# Patient Record
Sex: FEMALE | Race: WHITE | Employment: OTHER | URBAN - METROPOLITAN AREA
[De-identification: names, ages, dates, MRNs, and addresses within clinical notes are randomized per-mention and may not be internally consistent; named-entity substitution may affect disease eponyms.]

---

## 2017-02-01 ENCOUNTER — OFFICE VISIT (OUTPATIENT)
Dept: FAMILY MEDICINE CLINIC | Age: 72
End: 2017-02-01

## 2017-02-01 VITALS
DIASTOLIC BLOOD PRESSURE: 74 MMHG | SYSTOLIC BLOOD PRESSURE: 136 MMHG | WEIGHT: 193.4 LBS | RESPIRATION RATE: 16 BRPM | BODY MASS INDEX: 34.27 KG/M2 | HEIGHT: 63 IN | HEART RATE: 72 BPM

## 2017-02-01 DIAGNOSIS — H11.31 SUBCONJUNCTIVAL HEMORRHAGE OF RIGHT EYE: ICD-10-CM

## 2017-02-01 DIAGNOSIS — I16.0 HYPERTENSIVE URGENCY: Primary | ICD-10-CM

## 2017-02-01 DIAGNOSIS — D75.1 POLYCYTHEMIA: ICD-10-CM

## 2017-02-01 DIAGNOSIS — G47.00 INSOMNIA, UNSPECIFIED TYPE: ICD-10-CM

## 2017-02-01 DIAGNOSIS — E78.5 HYPERLIPIDEMIA, UNSPECIFIED HYPERLIPIDEMIA TYPE: ICD-10-CM

## 2017-02-01 PROCEDURE — 1090F PRES/ABSN URINE INCON ASSESS: CPT | Performed by: FAMILY MEDICINE

## 2017-02-01 PROCEDURE — 4040F PNEUMOC VAC/ADMIN/RCVD: CPT | Performed by: FAMILY MEDICINE

## 2017-02-01 PROCEDURE — G8427 DOCREV CUR MEDS BY ELIG CLIN: HCPCS | Performed by: FAMILY MEDICINE

## 2017-02-01 PROCEDURE — 1123F ACP DISCUSS/DSCN MKR DOCD: CPT | Performed by: FAMILY MEDICINE

## 2017-02-01 PROCEDURE — G8400 PT W/DXA NO RESULTS DOC: HCPCS | Performed by: FAMILY MEDICINE

## 2017-02-01 PROCEDURE — 99214 OFFICE O/P EST MOD 30 MIN: CPT | Performed by: FAMILY MEDICINE

## 2017-02-01 PROCEDURE — 3017F COLORECTAL CA SCREEN DOC REV: CPT | Performed by: FAMILY MEDICINE

## 2017-02-01 PROCEDURE — G8419 CALC BMI OUT NRM PARAM NOF/U: HCPCS | Performed by: FAMILY MEDICINE

## 2017-02-01 PROCEDURE — 4004F PT TOBACCO SCREEN RCVD TLK: CPT | Performed by: FAMILY MEDICINE

## 2017-02-01 PROCEDURE — 3014F SCREEN MAMMO DOC REV: CPT | Performed by: FAMILY MEDICINE

## 2017-02-01 PROCEDURE — G8484 FLU IMMUNIZE NO ADMIN: HCPCS | Performed by: FAMILY MEDICINE

## 2017-02-01 RX ORDER — ZOLPIDEM TARTRATE 10 MG/1
TABLET ORAL
Qty: 30 TABLET | Refills: 5 | Status: SHIPPED | OUTPATIENT
Start: 2017-02-01 | End: 2017-08-29 | Stop reason: SDUPTHER

## 2017-02-01 RX ORDER — TRAMADOL HYDROCHLORIDE 50 MG/1
50 TABLET ORAL EVERY 6 HOURS PRN
COMMUNITY
End: 2017-10-18 | Stop reason: SDUPTHER

## 2017-02-01 RX ORDER — HYDROCHLOROTHIAZIDE 12.5 MG/1
12.5 CAPSULE, GELATIN COATED ORAL DAILY
COMMUNITY
End: 2018-01-05

## 2017-02-01 RX ORDER — LABETALOL 200 MG/1
200 TABLET, FILM COATED ORAL 2 TIMES DAILY
COMMUNITY
End: 2018-01-05

## 2017-02-01 ASSESSMENT — ENCOUNTER SYMPTOMS
GASTROINTESTINAL NEGATIVE: 1
RESPIRATORY NEGATIVE: 1

## 2017-02-14 ENCOUNTER — OFFICE VISIT (OUTPATIENT)
Dept: ONCOLOGY | Age: 72
End: 2017-02-14

## 2017-02-14 VITALS
RESPIRATION RATE: 18 BRPM | OXYGEN SATURATION: 96 % | HEART RATE: 76 BPM | HEIGHT: 63 IN | SYSTOLIC BLOOD PRESSURE: 178 MMHG | DIASTOLIC BLOOD PRESSURE: 87 MMHG | TEMPERATURE: 97 F | WEIGHT: 195 LBS | BODY MASS INDEX: 34.55 KG/M2

## 2017-02-14 DIAGNOSIS — D75.1 POLYCYTHEMIA: Primary | ICD-10-CM

## 2017-02-14 DIAGNOSIS — R71.8 OTHER ABNORMALITY OF RED BLOOD CELLS: ICD-10-CM

## 2017-02-14 PROCEDURE — G8417 CALC BMI ABV UP PARAM F/U: HCPCS | Performed by: INTERNAL MEDICINE

## 2017-02-14 PROCEDURE — 99204 OFFICE O/P NEW MOD 45 MIN: CPT | Performed by: INTERNAL MEDICINE

## 2017-02-14 PROCEDURE — 1090F PRES/ABSN URINE INCON ASSESS: CPT | Performed by: INTERNAL MEDICINE

## 2017-02-14 PROCEDURE — 1123F ACP DISCUSS/DSCN MKR DOCD: CPT | Performed by: INTERNAL MEDICINE

## 2017-02-14 PROCEDURE — G8484 FLU IMMUNIZE NO ADMIN: HCPCS | Performed by: INTERNAL MEDICINE

## 2017-02-14 PROCEDURE — 3014F SCREEN MAMMO DOC REV: CPT | Performed by: INTERNAL MEDICINE

## 2017-02-14 PROCEDURE — 3017F COLORECTAL CA SCREEN DOC REV: CPT | Performed by: INTERNAL MEDICINE

## 2017-02-14 PROCEDURE — 4004F PT TOBACCO SCREEN RCVD TLK: CPT | Performed by: INTERNAL MEDICINE

## 2017-02-14 PROCEDURE — G8400 PT W/DXA NO RESULTS DOC: HCPCS | Performed by: INTERNAL MEDICINE

## 2017-02-14 PROCEDURE — G8427 DOCREV CUR MEDS BY ELIG CLIN: HCPCS | Performed by: INTERNAL MEDICINE

## 2017-02-14 PROCEDURE — 4040F PNEUMOC VAC/ADMIN/RCVD: CPT | Performed by: INTERNAL MEDICINE

## 2017-02-14 RX ORDER — ATORVASTATIN CALCIUM 40 MG/1
40 TABLET, FILM COATED ORAL DAILY
COMMUNITY
Start: 2017-01-25 | End: 2018-01-05 | Stop reason: SDUPTHER

## 2017-02-15 PROBLEM — D75.1 POLYCYTHEMIA: Status: ACTIVE | Noted: 2017-02-15

## 2017-02-21 ENCOUNTER — OFFICE VISIT (OUTPATIENT)
Dept: ONCOLOGY | Age: 72
End: 2017-02-21

## 2017-02-21 VITALS
HEART RATE: 73 BPM | DIASTOLIC BLOOD PRESSURE: 88 MMHG | OXYGEN SATURATION: 99 % | WEIGHT: 195.4 LBS | HEIGHT: 63 IN | TEMPERATURE: 97.1 F | SYSTOLIC BLOOD PRESSURE: 166 MMHG | BODY MASS INDEX: 34.62 KG/M2 | RESPIRATION RATE: 18 BRPM

## 2017-02-21 DIAGNOSIS — D75.1 POLYCYTHEMIA: Primary | ICD-10-CM

## 2017-02-21 PROCEDURE — G8427 DOCREV CUR MEDS BY ELIG CLIN: HCPCS | Performed by: INTERNAL MEDICINE

## 2017-02-21 PROCEDURE — G8417 CALC BMI ABV UP PARAM F/U: HCPCS | Performed by: INTERNAL MEDICINE

## 2017-02-21 PROCEDURE — 99213 OFFICE O/P EST LOW 20 MIN: CPT | Performed by: INTERNAL MEDICINE

## 2017-02-21 PROCEDURE — 1123F ACP DISCUSS/DSCN MKR DOCD: CPT | Performed by: INTERNAL MEDICINE

## 2017-02-21 PROCEDURE — 1090F PRES/ABSN URINE INCON ASSESS: CPT | Performed by: INTERNAL MEDICINE

## 2017-02-21 PROCEDURE — 3014F SCREEN MAMMO DOC REV: CPT | Performed by: INTERNAL MEDICINE

## 2017-02-21 PROCEDURE — G8484 FLU IMMUNIZE NO ADMIN: HCPCS | Performed by: INTERNAL MEDICINE

## 2017-02-21 PROCEDURE — 3017F COLORECTAL CA SCREEN DOC REV: CPT | Performed by: INTERNAL MEDICINE

## 2017-02-21 PROCEDURE — 4040F PNEUMOC VAC/ADMIN/RCVD: CPT | Performed by: INTERNAL MEDICINE

## 2017-02-21 PROCEDURE — G8400 PT W/DXA NO RESULTS DOC: HCPCS | Performed by: INTERNAL MEDICINE

## 2017-02-21 PROCEDURE — 4004F PT TOBACCO SCREEN RCVD TLK: CPT | Performed by: INTERNAL MEDICINE

## 2017-06-19 ENCOUNTER — OFFICE VISIT (OUTPATIENT)
Dept: FAMILY MEDICINE CLINIC | Age: 72
End: 2017-06-19

## 2017-06-19 VITALS
HEIGHT: 64 IN | BODY MASS INDEX: 32.61 KG/M2 | RESPIRATION RATE: 14 BRPM | WEIGHT: 191 LBS | HEART RATE: 82 BPM | SYSTOLIC BLOOD PRESSURE: 136 MMHG | OXYGEN SATURATION: 98 % | DIASTOLIC BLOOD PRESSURE: 84 MMHG

## 2017-06-19 DIAGNOSIS — I10 ESSENTIAL HYPERTENSION: Primary | ICD-10-CM

## 2017-06-19 DIAGNOSIS — F41.0 PANIC DISORDER: ICD-10-CM

## 2017-06-19 DIAGNOSIS — B00.2 ORAL HERPES SIMPLEX INFECTION: ICD-10-CM

## 2017-06-19 DIAGNOSIS — E78.5 HYPERLIPIDEMIA, UNSPECIFIED HYPERLIPIDEMIA TYPE: ICD-10-CM

## 2017-06-19 DIAGNOSIS — M19.071 PRIMARY OSTEOARTHRITIS OF BOTH FEET: ICD-10-CM

## 2017-06-19 DIAGNOSIS — M19.072 PRIMARY OSTEOARTHRITIS OF BOTH FEET: ICD-10-CM

## 2017-06-19 DIAGNOSIS — Z51.81 MEDICATION MONITORING ENCOUNTER: ICD-10-CM

## 2017-06-19 PROCEDURE — 1123F ACP DISCUSS/DSCN MKR DOCD: CPT | Performed by: FAMILY MEDICINE

## 2017-06-19 PROCEDURE — G8427 DOCREV CUR MEDS BY ELIG CLIN: HCPCS | Performed by: FAMILY MEDICINE

## 2017-06-19 PROCEDURE — 3017F COLORECTAL CA SCREEN DOC REV: CPT | Performed by: FAMILY MEDICINE

## 2017-06-19 PROCEDURE — G8400 PT W/DXA NO RESULTS DOC: HCPCS | Performed by: FAMILY MEDICINE

## 2017-06-19 PROCEDURE — 4040F PNEUMOC VAC/ADMIN/RCVD: CPT | Performed by: FAMILY MEDICINE

## 2017-06-19 PROCEDURE — 4004F PT TOBACCO SCREEN RCVD TLK: CPT | Performed by: FAMILY MEDICINE

## 2017-06-19 PROCEDURE — 3014F SCREEN MAMMO DOC REV: CPT | Performed by: FAMILY MEDICINE

## 2017-06-19 PROCEDURE — 1090F PRES/ABSN URINE INCON ASSESS: CPT | Performed by: FAMILY MEDICINE

## 2017-06-19 PROCEDURE — G8417 CALC BMI ABV UP PARAM F/U: HCPCS | Performed by: FAMILY MEDICINE

## 2017-06-19 PROCEDURE — 99214 OFFICE O/P EST MOD 30 MIN: CPT | Performed by: FAMILY MEDICINE

## 2017-06-19 RX ORDER — FAMCICLOVIR 500 MG/1
TABLET, FILM COATED ORAL
Qty: 24 TABLET | Refills: 1 | Status: SHIPPED | OUTPATIENT
Start: 2017-06-19 | End: 2017-08-29 | Stop reason: SDUPTHER

## 2017-06-19 RX ORDER — FAMCICLOVIR 500 MG/1
TABLET, FILM COATED ORAL
COMMUNITY
End: 2017-06-19 | Stop reason: SDUPTHER

## 2017-06-19 ASSESSMENT — ENCOUNTER SYMPTOMS
RESPIRATORY NEGATIVE: 1
COUGH: 0
GASTROINTESTINAL NEGATIVE: 1

## 2017-06-23 LAB
ANION GAP SERPL CALCULATED.3IONS-SCNC: 15 MMOL/L
BUN BLDV-MCNC: 9 MG/DL (ref 9–24)
CALCIUM SERPL-MCNC: 9.7 MG/DL (ref 8.7–10.8)
CHLORIDE BLD-SCNC: 106 MMOL/L (ref 95–111)
CHOLESTEROL/HDL RATIO: 3.9
CHOLESTEROL: 163 MG/DL
CO2: 27 MMOL/L (ref 21–32)
CREAT SERPL-MCNC: 0.8 MG/DL (ref 0.5–1.3)
EGFR AFRICAN AMERICAN: 86
EGFR IF NONAFRICAN AMERICAN: 71
GLUCOSE: 104 MG/DL (ref 70–100)
HDLC SERPL-MCNC: 42 MG/DL (ref 40–60)
LDL CHOLESTEROL CALCULATED: 82 MG/DL
LDL/HDL RATIO: 2
POTASSIUM SERPL-SCNC: 4.6 MMOL/L (ref 3.5–5.4)
SODIUM BLD-SCNC: 143 MMOL/L (ref 134–147)
TRIGL SERPL-MCNC: 196 MG/DL
VLDLC SERPL CALC-MCNC: 39 MG/DL

## 2017-06-29 RX ORDER — ALPRAZOLAM 1 MG/1
TABLET ORAL
Qty: 30 TABLET | Refills: 1 | Status: SHIPPED | OUTPATIENT
Start: 2017-06-29 | End: 2017-08-29 | Stop reason: SDUPTHER

## 2017-08-29 DIAGNOSIS — B00.2 ORAL HERPES SIMPLEX INFECTION: ICD-10-CM

## 2017-08-29 DIAGNOSIS — G47.00 INSOMNIA, UNSPECIFIED TYPE: ICD-10-CM

## 2017-08-29 RX ORDER — ALPRAZOLAM 1 MG/1
1 TABLET ORAL NIGHTLY PRN
Qty: 90 TABLET | Refills: 1 | Status: SHIPPED | OUTPATIENT
Start: 2017-08-29 | End: 2018-02-20 | Stop reason: SDUPTHER

## 2017-08-29 RX ORDER — NITROGLYCERIN 0.4 MG/1
0.4 TABLET SUBLINGUAL EVERY 5 MIN PRN
Qty: 25 TABLET | Refills: 1 | Status: SHIPPED | OUTPATIENT
Start: 2017-08-29 | End: 2019-02-25 | Stop reason: SDUPTHER

## 2017-08-29 RX ORDER — ZOLPIDEM TARTRATE 10 MG/1
TABLET ORAL
Qty: 90 TABLET | Refills: 2 | Status: SHIPPED | OUTPATIENT
Start: 2017-08-29 | End: 2018-04-09

## 2017-08-29 RX ORDER — FAMCICLOVIR 500 MG/1
TABLET, FILM COATED ORAL
Qty: 24 TABLET | Refills: 1 | Status: SHIPPED | OUTPATIENT
Start: 2017-08-29 | End: 2018-01-05

## 2017-10-18 ENCOUNTER — TELEPHONE (OUTPATIENT)
Dept: FAMILY MEDICINE CLINIC | Age: 72
End: 2017-10-18

## 2017-10-18 DIAGNOSIS — M79.671 FOOT PAIN, RIGHT: Primary | ICD-10-CM

## 2017-10-18 RX ORDER — TRAMADOL HYDROCHLORIDE 50 MG/1
50 TABLET ORAL EVERY 6 HOURS PRN
Qty: 60 TABLET | Refills: 1 | Status: SHIPPED | OUTPATIENT
Start: 2017-10-18 | End: 2018-08-11 | Stop reason: SDUPTHER

## 2017-10-18 NOTE — TELEPHONE ENCOUNTER
Patient asking for refill of Tramadol to help with her foot pain to use prn. Asking to send it Arnel Rausch. If no call will check with pharmacy after 4. Please advise.

## 2017-12-29 ENCOUNTER — TELEPHONE (OUTPATIENT)
Dept: FAMILY MEDICINE CLINIC | Age: 72
End: 2017-12-29

## 2018-01-02 ENCOUNTER — TELEPHONE (OUTPATIENT)
Dept: FAMILY MEDICINE CLINIC | Age: 73
End: 2018-01-02

## 2018-01-05 ENCOUNTER — OFFICE VISIT (OUTPATIENT)
Dept: FAMILY MEDICINE CLINIC | Age: 73
End: 2018-01-05
Payer: MEDICARE

## 2018-01-05 VITALS
TEMPERATURE: 97.5 F | HEART RATE: 80 BPM | BODY MASS INDEX: 26.49 KG/M2 | WEIGHT: 159 LBS | RESPIRATION RATE: 14 BRPM | HEIGHT: 65 IN | SYSTOLIC BLOOD PRESSURE: 116 MMHG | OXYGEN SATURATION: 98 % | DIASTOLIC BLOOD PRESSURE: 70 MMHG

## 2018-01-05 DIAGNOSIS — I25.10 CORONARY ARTERY DISEASE INVOLVING NATIVE HEART WITHOUT ANGINA PECTORIS, UNSPECIFIED VESSEL OR LESION TYPE: ICD-10-CM

## 2018-01-05 DIAGNOSIS — I63.02 THROMBOTIC STROKE INVOLVING BASILAR ARTERY (HCC): Primary | ICD-10-CM

## 2018-01-05 DIAGNOSIS — E78.5 HYPERLIPIDEMIA, UNSPECIFIED HYPERLIPIDEMIA TYPE: ICD-10-CM

## 2018-01-05 DIAGNOSIS — G47.00 INSOMNIA, UNSPECIFIED TYPE: ICD-10-CM

## 2018-01-05 DIAGNOSIS — Z95.1 S/P CABG X 4: ICD-10-CM

## 2018-01-05 DIAGNOSIS — D75.1 POLYCYTHEMIA: ICD-10-CM

## 2018-01-05 PROCEDURE — 3014F SCREEN MAMMO DOC REV: CPT | Performed by: NURSE PRACTITIONER

## 2018-01-05 PROCEDURE — 1036F TOBACCO NON-USER: CPT | Performed by: NURSE PRACTITIONER

## 2018-01-05 PROCEDURE — G8400 PT W/DXA NO RESULTS DOC: HCPCS | Performed by: NURSE PRACTITIONER

## 2018-01-05 PROCEDURE — 99214 OFFICE O/P EST MOD 30 MIN: CPT | Performed by: NURSE PRACTITIONER

## 2018-01-05 PROCEDURE — G8419 CALC BMI OUT NRM PARAM NOF/U: HCPCS | Performed by: NURSE PRACTITIONER

## 2018-01-05 PROCEDURE — 1123F ACP DISCUSS/DSCN MKR DOCD: CPT | Performed by: NURSE PRACTITIONER

## 2018-01-05 PROCEDURE — 1090F PRES/ABSN URINE INCON ASSESS: CPT | Performed by: NURSE PRACTITIONER

## 2018-01-05 PROCEDURE — G8484 FLU IMMUNIZE NO ADMIN: HCPCS | Performed by: NURSE PRACTITIONER

## 2018-01-05 PROCEDURE — G8427 DOCREV CUR MEDS BY ELIG CLIN: HCPCS | Performed by: NURSE PRACTITIONER

## 2018-01-05 PROCEDURE — G8598 ASA/ANTIPLAT THER USED: HCPCS | Performed by: NURSE PRACTITIONER

## 2018-01-05 PROCEDURE — 3017F COLORECTAL CA SCREEN DOC REV: CPT | Performed by: NURSE PRACTITIONER

## 2018-01-05 PROCEDURE — 4040F PNEUMOC VAC/ADMIN/RCVD: CPT | Performed by: NURSE PRACTITIONER

## 2018-01-05 RX ORDER — MULTIPLE VITAMINS W/ MINERALS TAB 9MG-400MCG
1 TAB ORAL DAILY
COMMUNITY

## 2018-01-05 RX ORDER — LISINOPRIL 10 MG/1
10 TABLET ORAL DAILY
Qty: 90 TABLET | Refills: 3 | Status: SHIPPED | OUTPATIENT
Start: 2018-01-05 | End: 2019-06-28 | Stop reason: SDUPTHER

## 2018-01-05 RX ORDER — FUROSEMIDE 20 MG/1
20 TABLET ORAL DAILY
COMMUNITY
End: 2018-01-05 | Stop reason: SDUPTHER

## 2018-01-05 RX ORDER — DRONABINOL 2.5 MG/1
2.5 CAPSULE ORAL
COMMUNITY
End: 2018-04-09

## 2018-01-05 RX ORDER — LISINOPRIL 10 MG/1
10 TABLET ORAL DAILY
COMMUNITY
End: 2018-01-05

## 2018-01-05 RX ORDER — FUROSEMIDE 20 MG/1
20 TABLET ORAL DAILY
Qty: 90 TABLET | Refills: 3 | Status: SHIPPED | OUTPATIENT
Start: 2018-01-05 | End: 2019-11-11

## 2018-01-05 RX ORDER — ACYCLOVIR 50 MG/G
OINTMENT TOPICAL PRN
COMMUNITY
End: 2019-11-11 | Stop reason: SDUPTHER

## 2018-01-05 RX ORDER — ATORVASTATIN CALCIUM 40 MG/1
40 TABLET, FILM COATED ORAL DAILY
Qty: 90 TABLET | Refills: 3 | Status: SHIPPED | OUTPATIENT
Start: 2018-01-05 | End: 2018-12-16 | Stop reason: SDUPTHER

## 2018-01-05 RX ORDER — LISINOPRIL 10 MG/1
10 TABLET ORAL DAILY
COMMUNITY
End: 2018-01-05 | Stop reason: SDUPTHER

## 2018-01-05 RX ORDER — CARVEDILOL 6.25 MG/1
6.25 TABLET ORAL 2 TIMES DAILY
COMMUNITY
End: 2018-01-05

## 2018-01-05 RX ORDER — OXYCODONE HYDROCHLORIDE AND ACETAMINOPHEN 5; 325 MG/1; MG/1
2 TABLET ORAL EVERY 4 HOURS PRN
COMMUNITY
End: 2018-05-21 | Stop reason: SDUPTHER

## 2018-01-05 ASSESSMENT — ENCOUNTER SYMPTOMS
COUGH: 0
CHEST TIGHTNESS: 0
SHORTNESS OF BREATH: 0
NAUSEA: 0
ABDOMINAL PAIN: 0

## 2018-01-05 ASSESSMENT — PATIENT HEALTH QUESTIONNAIRE - PHQ9
SUM OF ALL RESPONSES TO PHQ QUESTIONS 1-9: 0
1. LITTLE INTEREST OR PLEASURE IN DOING THINGS: 0
2. FEELING DOWN, DEPRESSED OR HOPELESS: 0
SUM OF ALL RESPONSES TO PHQ9 QUESTIONS 1 & 2: 0

## 2018-01-05 NOTE — PROGRESS NOTES
Subjective:      Patient ID: Frankey Croon is a 67 y.o. female. HPI: Hospital Follow Up    Chief Complaint   Patient presents with    Follow-Up from [address] [place] [address]/[facility] 12/29/18  martha s/p CVA  transitional care  needs colonoscopy and mammo    Medication Refill     pt request 90-day refills        End of November she had CABG x 4 with Dr. Raheem Lynch. Was doing well and discharged home. Hospital Course:   Ms. Aleks Dumont Is a 66-year-old female patient who was found unresponsive by her grandchildren at 3:00 p.m. On December 7th. EMS was contacted and the patient was found to be pulseless. She recently had a CABG therefore she was given supportive breathing. She was taken to [facility] where her mentation improved and her NIH stroke scale score was initially a 3. She had a noncontrast head CT which was negative for acute process. She was outside of the window for IV tPA. 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 The patient declined requiring intubation. 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 She was admitted to the neuro ICU for further workup and evaluation. While in the neuro ICU the patient underwent a stroke workup including a CT of the head which revealed an area of ischemia in the left occipital region. She was unable to have an MRI due to the recent CABG. She had a transthoracic echocardiogram showing an ejection fraction of 35-40%, moderately dilated left atrium, no shunt, global hypokinesis. Lipid panel revealed a total cholesterol 102 and LDL 52 and hemoglobin A1c was satisfactory of 5.1. Cardiology was consulted due to patient's recent CABG.  They did a FELISHA which showed ejection fraction of 35-40%, moderate mitral regurgitation, no left monitoring encounter    Depression    Contusion of knee, right    Sleep walking    Foot pain, right    Postmenopausal state    Hair thinning    Oral herpes simplex infection    Polycythemia         BP Readings from Last 3 Encounters:   01/05/18 116/70   06/19/17 136/84   02/21/17 (!) 166/88         No results found for: LABA1C  No results found for: EAG    No components found for: CHLPL  Lab Results   Component Value Date    TRIG 196 (H) 06/22/2017     Lab Results   Component Value Date    HDL 42 06/22/2017     Lab Results   Component Value Date    LDLCALC 82 06/22/2017     Lab Results   Component Value Date    LABVLDL 39 (H) 06/22/2017         Chemistry        Component Value Date/Time     12/07/2017 1643    K 3.9 12/07/2017 1643     12/07/2017 1643    CO2 23 12/07/2017 1643    BUN 10 12/07/2017 1643    CREATININE 0.93 12/07/2017 1643        Component Value Date/Time    CALCIUM 9.3 12/07/2017 1643    ALKPHOS 65 11/21/2017 0905    AST 30 11/21/2017 0905    ALT 24 11/21/2017 0905    BILITOT 1.3 (H) 11/21/2017 0905          No results found for: TSH, V6URTKL, P8XEOYB, THYROIDAB    Lab Results   Component Value Date    WBC 13.0 (H) 12/07/2017    HGB 12.3 12/07/2017    HCT 36.4 12/07/2017    MCV 88.3 12/07/2017     12/07/2017       Health Maintenance   Topic Date Due    Hepatitis C screen  1945    Colon cancer screen colonoscopy  08/12/1995    DTaP/Tdap/Td vaccine (1 - Tdap) 11/05/2010    Flu vaccine (1) 09/01/2017    Breast cancer screen  12/04/2017    Potassium monitoring  12/07/2018    Creatinine monitoring  12/07/2018    Lipid screen  06/22/2022    Zostavax vaccine  Completed    DEXA (modify frequency per FRAX score)  Completed    Pneumococcal low/med risk  Completed       Immunization History   Administered Date(s) Administered    Hib, unspecified foumulation 11/23/2010    Influenza Whole 11/11/2010    MMR 11/23/2010    Pneumococcal 13-valent Conjugate (Andriette Query)

## 2018-01-30 ENCOUNTER — TELEPHONE (OUTPATIENT)
Dept: FAMILY MEDICINE CLINIC | Age: 73
End: 2018-01-30

## 2018-02-20 RX ORDER — ALPRAZOLAM 1 MG/1
1 TABLET ORAL NIGHTLY PRN
Qty: 90 TABLET | Refills: 1 | Status: SHIPPED | OUTPATIENT
Start: 2018-02-20 | End: 2018-12-13 | Stop reason: SDUPTHER

## 2018-04-09 ENCOUNTER — OFFICE VISIT (OUTPATIENT)
Dept: FAMILY MEDICINE CLINIC | Age: 73
End: 2018-04-09
Payer: MEDICARE

## 2018-04-09 VITALS
OXYGEN SATURATION: 98 % | HEART RATE: 96 BPM | DIASTOLIC BLOOD PRESSURE: 72 MMHG | SYSTOLIC BLOOD PRESSURE: 112 MMHG | HEIGHT: 64 IN | WEIGHT: 155 LBS | BODY MASS INDEX: 26.46 KG/M2 | RESPIRATION RATE: 14 BRPM

## 2018-04-09 DIAGNOSIS — F32.1 MODERATE MAJOR DEPRESSION, SINGLE EPISODE (HCC): ICD-10-CM

## 2018-04-09 DIAGNOSIS — I63.02 THROMBOTIC STROKE INVOLVING BASILAR ARTERY (HCC): Primary | ICD-10-CM

## 2018-04-09 DIAGNOSIS — Z51.81 MEDICATION MONITORING ENCOUNTER: ICD-10-CM

## 2018-04-09 DIAGNOSIS — Z95.1 S/P CABG X 4: ICD-10-CM

## 2018-04-09 DIAGNOSIS — Z91.81 AT HIGH RISK FOR FALLS: ICD-10-CM

## 2018-04-09 DIAGNOSIS — I10 ESSENTIAL HYPERTENSION: ICD-10-CM

## 2018-04-09 DIAGNOSIS — F32.9 REACTIVE DEPRESSION: ICD-10-CM

## 2018-04-09 DIAGNOSIS — I25.10 CORONARY ARTERY DISEASE INVOLVING NATIVE HEART WITHOUT ANGINA PECTORIS, UNSPECIFIED VESSEL OR LESION TYPE: ICD-10-CM

## 2018-04-09 PROCEDURE — 3017F COLORECTAL CA SCREEN DOC REV: CPT | Performed by: FAMILY MEDICINE

## 2018-04-09 PROCEDURE — G8427 DOCREV CUR MEDS BY ELIG CLIN: HCPCS | Performed by: FAMILY MEDICINE

## 2018-04-09 PROCEDURE — 4040F PNEUMOC VAC/ADMIN/RCVD: CPT | Performed by: FAMILY MEDICINE

## 2018-04-09 PROCEDURE — 99214 OFFICE O/P EST MOD 30 MIN: CPT | Performed by: FAMILY MEDICINE

## 2018-04-09 PROCEDURE — 3014F SCREEN MAMMO DOC REV: CPT | Performed by: FAMILY MEDICINE

## 2018-04-09 PROCEDURE — 1036F TOBACCO NON-USER: CPT | Performed by: FAMILY MEDICINE

## 2018-04-09 PROCEDURE — G8419 CALC BMI OUT NRM PARAM NOF/U: HCPCS | Performed by: FAMILY MEDICINE

## 2018-04-09 PROCEDURE — 1123F ACP DISCUSS/DSCN MKR DOCD: CPT | Performed by: FAMILY MEDICINE

## 2018-04-09 PROCEDURE — G8400 PT W/DXA NO RESULTS DOC: HCPCS | Performed by: FAMILY MEDICINE

## 2018-04-09 PROCEDURE — 1090F PRES/ABSN URINE INCON ASSESS: CPT | Performed by: FAMILY MEDICINE

## 2018-04-09 PROCEDURE — G8598 ASA/ANTIPLAT THER USED: HCPCS | Performed by: FAMILY MEDICINE

## 2018-04-09 RX ORDER — TRAZODONE HYDROCHLORIDE 50 MG/1
50 TABLET ORAL NIGHTLY
COMMUNITY
End: 2018-12-10 | Stop reason: SDUPTHER

## 2018-04-09 RX ORDER — POTASSIUM CHLORIDE 1.5 G/1.77G
20 POWDER, FOR SOLUTION ORAL 2 TIMES DAILY
COMMUNITY
End: 2019-11-11

## 2018-04-09 ASSESSMENT — ENCOUNTER SYMPTOMS
WHEEZING: 0
GASTROINTESTINAL NEGATIVE: 1
SHORTNESS OF BREATH: 1
ALLERGIC/IMMUNOLOGIC NEGATIVE: 1
COUGH: 0

## 2018-05-21 ENCOUNTER — OFFICE VISIT (OUTPATIENT)
Dept: FAMILY MEDICINE CLINIC | Age: 73
End: 2018-05-21
Payer: MEDICARE

## 2018-05-21 VITALS
HEIGHT: 64 IN | HEART RATE: 64 BPM | WEIGHT: 145.9 LBS | SYSTOLIC BLOOD PRESSURE: 110 MMHG | DIASTOLIC BLOOD PRESSURE: 68 MMHG | BODY MASS INDEX: 24.91 KG/M2 | RESPIRATION RATE: 12 BRPM

## 2018-05-21 DIAGNOSIS — F32.9 REACTIVE DEPRESSION: Primary | ICD-10-CM

## 2018-05-21 DIAGNOSIS — I25.10 CORONARY ARTERY DISEASE INVOLVING NATIVE HEART WITHOUT ANGINA PECTORIS, UNSPECIFIED VESSEL OR LESION TYPE: ICD-10-CM

## 2018-05-21 DIAGNOSIS — I10 ESSENTIAL HYPERTENSION: ICD-10-CM

## 2018-05-21 DIAGNOSIS — M19.072 PRIMARY OSTEOARTHRITIS OF BOTH FEET: ICD-10-CM

## 2018-05-21 DIAGNOSIS — M19.071 PRIMARY OSTEOARTHRITIS OF BOTH FEET: ICD-10-CM

## 2018-05-21 DIAGNOSIS — Z95.1 S/P CABG X 4: ICD-10-CM

## 2018-05-21 DIAGNOSIS — I63.02 THROMBOTIC STROKE INVOLVING BASILAR ARTERY (HCC): ICD-10-CM

## 2018-05-21 DIAGNOSIS — Z12.31 ENCOUNTER FOR SCREENING MAMMOGRAM FOR BREAST CANCER: ICD-10-CM

## 2018-05-21 PROCEDURE — 3017F COLORECTAL CA SCREEN DOC REV: CPT | Performed by: FAMILY MEDICINE

## 2018-05-21 PROCEDURE — 1123F ACP DISCUSS/DSCN MKR DOCD: CPT | Performed by: FAMILY MEDICINE

## 2018-05-21 PROCEDURE — 1036F TOBACCO NON-USER: CPT | Performed by: FAMILY MEDICINE

## 2018-05-21 PROCEDURE — G8598 ASA/ANTIPLAT THER USED: HCPCS | Performed by: FAMILY MEDICINE

## 2018-05-21 PROCEDURE — G8400 PT W/DXA NO RESULTS DOC: HCPCS | Performed by: FAMILY MEDICINE

## 2018-05-21 PROCEDURE — 99214 OFFICE O/P EST MOD 30 MIN: CPT | Performed by: FAMILY MEDICINE

## 2018-05-21 PROCEDURE — G8417 CALC BMI ABV UP PARAM F/U: HCPCS | Performed by: FAMILY MEDICINE

## 2018-05-21 PROCEDURE — G8427 DOCREV CUR MEDS BY ELIG CLIN: HCPCS | Performed by: FAMILY MEDICINE

## 2018-05-21 PROCEDURE — 1090F PRES/ABSN URINE INCON ASSESS: CPT | Performed by: FAMILY MEDICINE

## 2018-05-21 PROCEDURE — 4040F PNEUMOC VAC/ADMIN/RCVD: CPT | Performed by: FAMILY MEDICINE

## 2018-05-21 RX ORDER — OXYCODONE HYDROCHLORIDE AND ACETAMINOPHEN 5; 325 MG/1; MG/1
2 TABLET ORAL EVERY 8 HOURS PRN
Qty: 21 TABLET | Refills: 0 | Status: SHIPPED | OUTPATIENT
Start: 2018-05-21 | End: 2018-06-05

## 2018-05-21 ASSESSMENT — ENCOUNTER SYMPTOMS
ALLERGIC/IMMUNOLOGIC NEGATIVE: 1
GASTROINTESTINAL NEGATIVE: 1
RESPIRATORY NEGATIVE: 1

## 2018-08-11 DIAGNOSIS — M79.671 FOOT PAIN, RIGHT: ICD-10-CM

## 2018-08-13 RX ORDER — TRAMADOL HYDROCHLORIDE 50 MG/1
TABLET ORAL
Qty: 60 TABLET | Refills: 1 | Status: SHIPPED | OUTPATIENT
Start: 2018-08-13 | End: 2018-09-12

## 2018-11-30 DIAGNOSIS — F32.9 REACTIVE DEPRESSION: ICD-10-CM

## 2018-12-10 RX ORDER — TRAZODONE HYDROCHLORIDE 50 MG/1
50 TABLET ORAL NIGHTLY
Qty: 90 TABLET | Refills: 3 | Status: SHIPPED | OUTPATIENT
Start: 2018-12-10 | End: 2019-02-21 | Stop reason: SINTOL

## 2018-12-16 DIAGNOSIS — I25.10 CORONARY ARTERY DISEASE INVOLVING NATIVE HEART WITHOUT ANGINA PECTORIS, UNSPECIFIED VESSEL OR LESION TYPE: ICD-10-CM

## 2018-12-17 RX ORDER — ATORVASTATIN CALCIUM 40 MG/1
TABLET, FILM COATED ORAL
Qty: 90 TABLET | Refills: 3 | Status: SHIPPED | OUTPATIENT
Start: 2018-12-17 | End: 2019-05-10 | Stop reason: SDUPTHER

## 2019-01-15 ENCOUNTER — TELEPHONE (OUTPATIENT)
Dept: FAMILY MEDICINE CLINIC | Age: 74
End: 2019-01-15

## 2019-01-15 DIAGNOSIS — I25.10 CORONARY ARTERY DISEASE INVOLVING NATIVE HEART WITHOUT ANGINA PECTORIS, UNSPECIFIED VESSEL OR LESION TYPE: ICD-10-CM

## 2019-01-15 RX ORDER — HYDROGEN PEROXIDE 2.65 ML/100ML
LIQUID ORAL; TOPICAL
Qty: 90 TABLET | Refills: 3 | Status: SHIPPED | OUTPATIENT
Start: 2019-01-15 | End: 2019-04-01 | Stop reason: SDUPTHER

## 2019-02-21 ENCOUNTER — OFFICE VISIT (OUTPATIENT)
Dept: FAMILY MEDICINE CLINIC | Age: 74
End: 2019-02-21
Payer: MEDICARE

## 2019-02-21 VITALS
HEART RATE: 64 BPM | RESPIRATION RATE: 20 BRPM | HEIGHT: 63 IN | SYSTOLIC BLOOD PRESSURE: 138 MMHG | WEIGHT: 132.4 LBS | BODY MASS INDEX: 23.46 KG/M2 | DIASTOLIC BLOOD PRESSURE: 78 MMHG

## 2019-02-21 DIAGNOSIS — R19.8 IRREGULAR BOWEL HABITS: ICD-10-CM

## 2019-02-21 DIAGNOSIS — F41.9 ANXIETY: ICD-10-CM

## 2019-02-21 DIAGNOSIS — F51.01 PRIMARY INSOMNIA: ICD-10-CM

## 2019-02-21 DIAGNOSIS — R19.5 LOOSE STOOLS: Primary | ICD-10-CM

## 2019-02-21 PROCEDURE — 3017F COLORECTAL CA SCREEN DOC REV: CPT | Performed by: FAMILY MEDICINE

## 2019-02-21 PROCEDURE — G8598 ASA/ANTIPLAT THER USED: HCPCS | Performed by: FAMILY MEDICINE

## 2019-02-21 PROCEDURE — 1123F ACP DISCUSS/DSCN MKR DOCD: CPT | Performed by: FAMILY MEDICINE

## 2019-02-21 PROCEDURE — G8484 FLU IMMUNIZE NO ADMIN: HCPCS | Performed by: FAMILY MEDICINE

## 2019-02-21 PROCEDURE — 4040F PNEUMOC VAC/ADMIN/RCVD: CPT | Performed by: FAMILY MEDICINE

## 2019-02-21 PROCEDURE — 1101F PT FALLS ASSESS-DOCD LE1/YR: CPT | Performed by: FAMILY MEDICINE

## 2019-02-21 PROCEDURE — G8400 PT W/DXA NO RESULTS DOC: HCPCS | Performed by: FAMILY MEDICINE

## 2019-02-21 PROCEDURE — G8427 DOCREV CUR MEDS BY ELIG CLIN: HCPCS | Performed by: FAMILY MEDICINE

## 2019-02-21 PROCEDURE — G8420 CALC BMI NORM PARAMETERS: HCPCS | Performed by: FAMILY MEDICINE

## 2019-02-21 PROCEDURE — 99214 OFFICE O/P EST MOD 30 MIN: CPT | Performed by: FAMILY MEDICINE

## 2019-02-21 PROCEDURE — 1090F PRES/ABSN URINE INCON ASSESS: CPT | Performed by: FAMILY MEDICINE

## 2019-02-21 PROCEDURE — 1036F TOBACCO NON-USER: CPT | Performed by: FAMILY MEDICINE

## 2019-02-21 RX ORDER — TRAMADOL HYDROCHLORIDE 50 MG/1
50 TABLET ORAL EVERY 6 HOURS PRN
COMMUNITY
End: 2019-11-11

## 2019-02-22 ASSESSMENT — ENCOUNTER SYMPTOMS
RESPIRATORY NEGATIVE: 1
NAUSEA: 0
VOMITING: 0
ABDOMINAL PAIN: 0
DIARRHEA: 1
BLOOD IN STOOL: 0

## 2019-02-25 DIAGNOSIS — F41.0 PANIC DISORDER: ICD-10-CM

## 2019-02-25 DIAGNOSIS — G47.00 INSOMNIA, UNSPECIFIED TYPE: ICD-10-CM

## 2019-02-25 RX ORDER — NITROGLYCERIN 0.4 MG/1
0.4 TABLET SUBLINGUAL EVERY 5 MIN PRN
Qty: 25 TABLET | Refills: 1 | Status: SHIPPED | OUTPATIENT
Start: 2019-02-25 | End: 2019-05-17 | Stop reason: SDUPTHER

## 2019-02-25 RX ORDER — ALPRAZOLAM 1 MG/1
1 TABLET ORAL NIGHTLY PRN
Qty: 45 TABLET | Refills: 3 | Status: SHIPPED | OUTPATIENT
Start: 2019-02-25 | End: 2019-04-01 | Stop reason: SDUPTHER

## 2019-05-10 DIAGNOSIS — I25.10 CORONARY ARTERY DISEASE INVOLVING NATIVE HEART WITHOUT ANGINA PECTORIS, UNSPECIFIED VESSEL OR LESION TYPE: ICD-10-CM

## 2019-05-10 RX ORDER — ATORVASTATIN CALCIUM 40 MG/1
TABLET, FILM COATED ORAL
Qty: 90 TABLET | Refills: 3 | Status: SHIPPED | OUTPATIENT
Start: 2019-05-10 | End: 2019-09-13 | Stop reason: SDUPTHER

## 2019-05-16 DIAGNOSIS — F41.0 PANIC DISORDER: ICD-10-CM

## 2019-05-16 DIAGNOSIS — G47.00 INSOMNIA, UNSPECIFIED TYPE: ICD-10-CM

## 2019-05-16 RX ORDER — ALPRAZOLAM 1 MG/1
1 TABLET ORAL NIGHTLY PRN
Qty: 45 TABLET | Refills: 3 | Status: SHIPPED | OUTPATIENT
Start: 2019-05-16 | End: 2019-08-14

## 2019-05-17 RX ORDER — NITROGLYCERIN 0.4 MG/1
0.4 TABLET SUBLINGUAL EVERY 5 MIN PRN
Qty: 25 TABLET | Refills: 1 | Status: SHIPPED | OUTPATIENT
Start: 2019-05-17

## 2019-05-17 NOTE — TELEPHONE ENCOUNTER
[facility]   Please refill if appropriate

## 2019-06-28 DIAGNOSIS — I25.10 CORONARY ARTERY DISEASE INVOLVING NATIVE HEART WITHOUT ANGINA PECTORIS, UNSPECIFIED VESSEL OR LESION TYPE: ICD-10-CM

## 2019-06-28 RX ORDER — LISINOPRIL 10 MG/1
10 TABLET ORAL DAILY
Qty: 90 TABLET | Refills: 3 | Status: SHIPPED | OUTPATIENT
Start: 2019-06-28 | End: 2019-09-13 | Stop reason: SDUPTHER

## 2019-09-13 DIAGNOSIS — I25.10 CORONARY ARTERY DISEASE INVOLVING NATIVE HEART WITHOUT ANGINA PECTORIS, UNSPECIFIED VESSEL OR LESION TYPE: ICD-10-CM

## 2019-09-16 RX ORDER — ATORVASTATIN CALCIUM 40 MG/1
TABLET, FILM COATED ORAL
Qty: 90 TABLET | Refills: 3 | Status: SHIPPED | OUTPATIENT
Start: 2019-09-16 | End: 2020-09-28

## 2019-09-16 RX ORDER — ASPIRIN 81 MG/1
81 TABLET ORAL DAILY
Qty: 90 TABLET | Refills: 3 | Status: SHIPPED | OUTPATIENT
Start: 2019-09-16 | End: 2021-02-08 | Stop reason: SDUPTHER

## 2019-09-16 RX ORDER — LISINOPRIL 10 MG/1
10 TABLET ORAL DAILY
Qty: 90 TABLET | Refills: 3 | Status: SHIPPED | OUTPATIENT
Start: 2019-09-16 | End: 2020-10-12

## 2019-10-31 DIAGNOSIS — F41.0 PANIC DISORDER: ICD-10-CM

## 2019-10-31 RX ORDER — ALPRAZOLAM 1 MG/1
TABLET ORAL
Qty: 30 TABLET | Refills: 5 | Status: SHIPPED | OUTPATIENT
Start: 2019-10-31 | End: 2020-08-11 | Stop reason: SDUPTHER

## 2019-11-11 ENCOUNTER — OFFICE VISIT (OUTPATIENT)
Dept: FAMILY MEDICINE CLINIC | Age: 74
End: 2019-11-11
Payer: MEDICARE

## 2019-11-11 VITALS
WEIGHT: 171.1 LBS | HEART RATE: 72 BPM | BODY MASS INDEX: 30.31 KG/M2 | SYSTOLIC BLOOD PRESSURE: 116 MMHG | RESPIRATION RATE: 18 BRPM | DIASTOLIC BLOOD PRESSURE: 78 MMHG

## 2019-11-11 DIAGNOSIS — Z95.1 S/P CABG X 4: ICD-10-CM

## 2019-11-11 DIAGNOSIS — I25.10 CORONARY ARTERY DISEASE INVOLVING NATIVE HEART WITHOUT ANGINA PECTORIS, UNSPECIFIED VESSEL OR LESION TYPE: ICD-10-CM

## 2019-11-11 DIAGNOSIS — I69.398 VERTIGO AS LATE EFFECT OF CEREBROVASCULAR ACCIDENT (CVA): ICD-10-CM

## 2019-11-11 DIAGNOSIS — I63.02 THROMBOTIC STROKE INVOLVING BASILAR ARTERY (HCC): ICD-10-CM

## 2019-11-11 DIAGNOSIS — Z91.81 AT HIGH RISK FOR FALLS: ICD-10-CM

## 2019-11-11 DIAGNOSIS — R42 VERTIGO AS LATE EFFECT OF CEREBROVASCULAR ACCIDENT (CVA): ICD-10-CM

## 2019-11-11 DIAGNOSIS — Z23 NEEDS FLU SHOT: ICD-10-CM

## 2019-11-11 DIAGNOSIS — B00.2 ORAL HERPES SIMPLEX INFECTION: ICD-10-CM

## 2019-11-11 DIAGNOSIS — I21.4 NON-STEMI (NON-ST ELEVATED MYOCARDIAL INFARCTION) (HCC): ICD-10-CM

## 2019-11-11 DIAGNOSIS — I10 ESSENTIAL HYPERTENSION: Primary | ICD-10-CM

## 2019-11-11 DIAGNOSIS — Z51.81 MEDICATION MONITORING ENCOUNTER: ICD-10-CM

## 2019-11-11 DIAGNOSIS — Z12.31 ENCOUNTER FOR SCREENING MAMMOGRAM FOR BREAST CANCER: ICD-10-CM

## 2019-11-11 PROBLEM — G45.3 AMAUROSIS FUGAX OF RIGHT EYE: Status: ACTIVE | Noted: 2019-11-11

## 2019-11-11 PROBLEM — S01.01XA OCCIPITAL SCALP LACERATION: Status: ACTIVE | Noted: 2019-11-11

## 2019-11-11 PROBLEM — E66.9 OBESITY: Status: ACTIVE | Noted: 2019-11-11

## 2019-11-11 PROBLEM — Z86.73 HISTORY OF CVA IN ADULTHOOD: Status: ACTIVE | Noted: 2019-11-11

## 2019-11-11 PROBLEM — S06.5XAA SDH (SUBDURAL HEMATOMA) (HCC): Status: ACTIVE | Noted: 2019-11-11

## 2019-11-11 PROBLEM — H11.30 SCH (SUBCONJUNCTIVAL HEMORRHAGE): Status: ACTIVE | Noted: 2019-11-11

## 2019-11-11 PROCEDURE — 99214 OFFICE O/P EST MOD 30 MIN: CPT | Performed by: FAMILY MEDICINE

## 2019-11-11 PROCEDURE — 90653 IIV ADJUVANT VACCINE IM: CPT | Performed by: FAMILY MEDICINE

## 2019-11-11 PROCEDURE — 3017F COLORECTAL CA SCREEN DOC REV: CPT | Performed by: FAMILY MEDICINE

## 2019-11-11 PROCEDURE — G8598 ASA/ANTIPLAT THER USED: HCPCS | Performed by: FAMILY MEDICINE

## 2019-11-11 PROCEDURE — G8427 DOCREV CUR MEDS BY ELIG CLIN: HCPCS | Performed by: FAMILY MEDICINE

## 2019-11-11 PROCEDURE — G0008 ADMIN INFLUENZA VIRUS VAC: HCPCS | Performed by: FAMILY MEDICINE

## 2019-11-11 PROCEDURE — 1036F TOBACCO NON-USER: CPT | Performed by: FAMILY MEDICINE

## 2019-11-11 PROCEDURE — 4040F PNEUMOC VAC/ADMIN/RCVD: CPT | Performed by: FAMILY MEDICINE

## 2019-11-11 PROCEDURE — G8417 CALC BMI ABV UP PARAM F/U: HCPCS | Performed by: FAMILY MEDICINE

## 2019-11-11 PROCEDURE — G8482 FLU IMMUNIZE ORDER/ADMIN: HCPCS | Performed by: FAMILY MEDICINE

## 2019-11-11 PROCEDURE — 1123F ACP DISCUSS/DSCN MKR DOCD: CPT | Performed by: FAMILY MEDICINE

## 2019-11-11 PROCEDURE — 1090F PRES/ABSN URINE INCON ASSESS: CPT | Performed by: FAMILY MEDICINE

## 2019-11-11 PROCEDURE — G8400 PT W/DXA NO RESULTS DOC: HCPCS | Performed by: FAMILY MEDICINE

## 2019-11-11 RX ORDER — ASPIRIN 81 MG/1
81 TABLET, CHEWABLE ORAL DAILY
COMMUNITY
End: 2019-11-11

## 2019-11-11 RX ORDER — ACYCLOVIR 50 MG/G
OINTMENT TOPICAL
Qty: 30 G | Refills: 1 | Status: SHIPPED | OUTPATIENT
Start: 2019-11-11 | End: 2019-11-12 | Stop reason: SDUPTHER

## 2019-11-11 ASSESSMENT — ENCOUNTER SYMPTOMS
GASTROINTESTINAL NEGATIVE: 1
RESPIRATORY NEGATIVE: 1
ALLERGIC/IMMUNOLOGIC NEGATIVE: 1

## 2019-11-12 ENCOUNTER — TELEPHONE (OUTPATIENT)
Dept: FAMILY MEDICINE CLINIC | Age: 74
End: 2019-11-12

## 2019-11-12 DIAGNOSIS — B00.2 ORAL HERPES SIMPLEX INFECTION: ICD-10-CM

## 2019-11-12 RX ORDER — ACYCLOVIR 50 MG/G
OINTMENT TOPICAL
Qty: 30 G | Refills: 1 | Status: SHIPPED | OUTPATIENT
Start: 2019-11-12 | End: 2021-03-30

## 2020-03-02 ENCOUNTER — TELEPHONE (OUTPATIENT)
Dept: FAMILY MEDICINE CLINIC | Age: 75
End: 2020-03-02

## 2020-03-02 RX ORDER — ESCITALOPRAM OXALATE 20 MG/1
20 TABLET ORAL DAILY
Qty: 90 TABLET | Refills: 1 | Status: SHIPPED | OUTPATIENT
Start: 2020-03-02 | End: 2020-03-02 | Stop reason: SDUPTHER

## 2020-03-02 RX ORDER — ESCITALOPRAM OXALATE 20 MG/1
20 TABLET ORAL DAILY
Qty: 90 TABLET | Refills: 1 | Status: SHIPPED | OUTPATIENT
Start: 2020-03-02 | End: 2021-03-30

## 2020-03-02 NOTE — TELEPHONE ENCOUNTER
Spoke with  Tosha Pollard on [facility]. Pt is taking the 10mg and has been on this for about 2wks. He is going to call Dr. Wally Street office to obtain a copy of the office notes from when this medication was started, he will have them fax the notes to our office. Please advise.

## 2020-03-25 ENCOUNTER — TELEPHONE (OUTPATIENT)
Dept: FAMILY MEDICINE CLINIC | Age: 75
End: 2020-03-25

## 2020-03-27 ENCOUNTER — TELEPHONE (OUTPATIENT)
Dept: FAMILY MEDICINE CLINIC | Age: 75
End: 2020-03-27

## 2020-07-08 ENCOUNTER — OFFICE VISIT (OUTPATIENT)
Dept: FAMILY MEDICINE CLINIC | Age: 75
End: 2020-07-08
Payer: MEDICARE

## 2020-07-08 VITALS
WEIGHT: 152.9 LBS | HEIGHT: 63 IN | RESPIRATION RATE: 16 BRPM | TEMPERATURE: 97.5 F | DIASTOLIC BLOOD PRESSURE: 74 MMHG | BODY MASS INDEX: 27.09 KG/M2 | SYSTOLIC BLOOD PRESSURE: 116 MMHG | HEART RATE: 60 BPM

## 2020-07-08 PROCEDURE — G8417 CALC BMI ABV UP PARAM F/U: HCPCS | Performed by: FAMILY MEDICINE

## 2020-07-08 PROCEDURE — G0439 PPPS, SUBSEQ VISIT: HCPCS | Performed by: FAMILY MEDICINE

## 2020-07-08 PROCEDURE — 1123F ACP DISCUSS/DSCN MKR DOCD: CPT | Performed by: FAMILY MEDICINE

## 2020-07-08 PROCEDURE — G0444 DEPRESSION SCREEN ANNUAL: HCPCS | Performed by: FAMILY MEDICINE

## 2020-07-08 PROCEDURE — G8427 DOCREV CUR MEDS BY ELIG CLIN: HCPCS | Performed by: FAMILY MEDICINE

## 2020-07-08 PROCEDURE — 1036F TOBACCO NON-USER: CPT | Performed by: FAMILY MEDICINE

## 2020-07-08 PROCEDURE — G8400 PT W/DXA NO RESULTS DOC: HCPCS | Performed by: FAMILY MEDICINE

## 2020-07-08 PROCEDURE — 4040F PNEUMOC VAC/ADMIN/RCVD: CPT | Performed by: FAMILY MEDICINE

## 2020-07-08 PROCEDURE — 3017F COLORECTAL CA SCREEN DOC REV: CPT | Performed by: FAMILY MEDICINE

## 2020-07-08 PROCEDURE — 99213 OFFICE O/P EST LOW 20 MIN: CPT | Performed by: FAMILY MEDICINE

## 2020-07-08 PROCEDURE — 1090F PRES/ABSN URINE INCON ASSESS: CPT | Performed by: FAMILY MEDICINE

## 2020-07-08 RX ORDER — MESALAMINE 1.2 G/1
2 TABLET, DELAYED RELEASE ORAL DAILY
COMMUNITY
Start: 2020-06-26

## 2020-07-08 RX ORDER — HYDROXYZINE PAMOATE 25 MG/1
25 CAPSULE ORAL NIGHTLY PRN
Qty: 30 CAPSULE | Refills: 0 | Status: SHIPPED | OUTPATIENT
Start: 2020-07-08 | End: 2020-11-12 | Stop reason: SDUPTHER

## 2020-07-08 SDOH — ECONOMIC STABILITY: INCOME INSECURITY: HOW HARD IS IT FOR YOU TO PAY FOR THE VERY BASICS LIKE FOOD, HOUSING, MEDICAL CARE, AND HEATING?: NOT HARD AT ALL

## 2020-07-08 SDOH — ECONOMIC STABILITY: TRANSPORTATION INSECURITY
IN THE PAST 12 MONTHS, HAS THE LACK OF TRANSPORTATION KEPT YOU FROM MEDICAL APPOINTMENTS OR FROM GETTING MEDICATIONS?: NO

## 2020-07-08 SDOH — ECONOMIC STABILITY: TRANSPORTATION INSECURITY
IN THE PAST 12 MONTHS, HAS LACK OF TRANSPORTATION KEPT YOU FROM MEETINGS, WORK, OR FROM GETTING THINGS NEEDED FOR DAILY LIVING?: NO

## 2020-07-08 SDOH — ECONOMIC STABILITY: FOOD INSECURITY: WITHIN THE PAST 12 MONTHS, THE FOOD YOU BOUGHT JUST DIDN'T LAST AND YOU DIDN'T HAVE MONEY TO GET MORE.: NEVER TRUE

## 2020-07-08 SDOH — ECONOMIC STABILITY: FOOD INSECURITY: WITHIN THE PAST 12 MONTHS, YOU WORRIED THAT YOUR FOOD WOULD RUN OUT BEFORE YOU GOT MONEY TO BUY MORE.: NEVER TRUE

## 2020-07-08 ASSESSMENT — PATIENT HEALTH QUESTIONNAIRE - PHQ9: SUM OF ALL RESPONSES TO PHQ QUESTIONS 1-9: 15

## 2020-07-08 ASSESSMENT — LIFESTYLE VARIABLES: HOW OFTEN DO YOU HAVE A DRINK CONTAINING ALCOHOL: 0

## 2020-07-08 NOTE — PROGRESS NOTES
 DTaP/Tdap/Td vaccine (2 - Td) 05/30/2028    DEXA (modify frequency per FRAX score)  Completed    Pneumococcal 65+ years Vaccine  Completed    Hepatitis A vaccine  Aged Out    Hepatitis B vaccine  Aged Out    Hib vaccine  Aged Out    Meningococcal (ACWY) vaccine  Aged Out

## 2020-07-08 NOTE — PATIENT INSTRUCTIONS
You may receive a survey about your visit with us today. The feedback from our patients helps us identify what is working well and where the service to all patients can be enhanced. Thank you! Use vistaril 25 mg at bedtime for sleep. Continue present medications. Patient Education        High Blood Pressure: Care Instructions  Overview     It's normal for blood pressure to go up and down throughout the day. But if it stays up, you have high blood pressure. Another name for high blood pressure is hypertension. Despite what a lot of people think, high blood pressure usually doesn't cause headaches or make you feel dizzy or lightheaded. It usually has no symptoms. But it does increase your risk of stroke, heart attack, and other problems. You and your doctor will talk about your risks of these problems based on your blood pressure. Your doctor will give you a goal for your blood pressure. Your goal will be based on your health and your age. Lifestyle changes, such as eating healthy and being active, are always important to help lower blood pressure. You might also take medicine to reach your blood pressure goal.  Follow-up care is a key part of your treatment and safety. Be sure to make and go to all appointments, and call your doctor if you are having problems. It's also a good idea to know your test results and keep a list of the medicines you take. How can you care for yourself at home? Medical treatment  · If you stop taking your medicine, your blood pressure will go back up. You may take one or more types of medicine to lower your blood pressure. Be safe with medicines. Take your medicine exactly as prescribed. Call your doctor if you think you are having a problem with your medicine. · Talk to your doctor before you start taking aspirin every day. Aspirin can help certain people lower their risk of a heart attack or stroke.  But taking aspirin isn't right for everyone, because it can cause serious or a strange feeling in the chest.  ? Sweating. ? Shortness of breath. ? Nausea or vomiting. ? Pain, pressure, or a strange feeling in the back, neck, jaw, or upper belly or in one or both shoulders or arms. ? Lightheadedness or sudden weakness. ? A fast or irregular heartbeat. · You have symptoms of a stroke. These may include:  ? Sudden numbness, tingling, weakness, or loss of movement in your face, arm, or leg, especially on only one side of your body. ? Sudden vision changes. ? Sudden trouble speaking. ? Sudden confusion or trouble understanding simple statements. ? Sudden problems with walking or balance. ? A sudden, severe headache that is different from past headaches. · You have severe back or belly pain. Do not wait until your blood pressure comes down on its own. Get help right away. Call your doctor now or seek immediate care if:  · Your blood pressure is much higher than normal (such as 180/120 or higher), but you don't have symptoms. · You think high blood pressure is causing symptoms, such as:  ? Severe headache.  ? Blurry vision. Watch closely for changes in your health, and be sure to contact your doctor if:  · Your blood pressure measures higher than your doctor recommends at least 2 times. That means the top number is higher or the bottom number is higher, or both. · You think you may be having side effects from your blood pressure medicine. Where can you learn more? Go to [email or web address] and sign in to your [employer] account. Enter S061 in the [facility] box to learn more about \"High Blood Pressure: Care Instructions. \"     If you do not have an account, please click on the \"Sign Up Now\" link. Current as of: December 16, 2019               Content Version: 12.5  © 4710-3510 Healthwise, Incorporated. Care instructions adapted under license by [facility] ([facility]).  If you have questions about a medical condition or this instruction, always ask your healthcare professional. [place] [address] any warranty or liability for your use of this information. Personalized Preventive Plan for Patel Cason - 7/8/2020  Medicare offers a range of preventive health benefits. Some of the tests and screenings are paid in full while other may be subject to a deductible, co-insurance, and/or copay. Some of these benefits include a comprehensive review of your medical history including lifestyle, illnesses that may run in your family, and various assessments and screenings as appropriate. After reviewing your medical record and screening and assessments performed today your provider may have ordered immunizations, labs, imaging, and/or referrals for you. A list of these orders (if applicable) as well as your Preventive Care list are included within your After Visit Summary for your review. Other Preventive Recommendations:    · A preventive eye exam performed by an eye specialist is recommended every 1-2 years to screen for glaucoma; cataracts, macular degeneration, and other eye disorders. · A preventive dental visit is recommended every 6 months. · Try to get at least 150 minutes of exercise per week or 10,000 steps per day on a pedometer . · Order or download the FREE \"Exercise & Physical Activity: Your Everyday Guide\" from [employer]. Call 2-809.173.3324 or search [employer] online. · You need 2413-2363 mg of calcium and 2683-6808 IU of vitamin D per day. It is possible to meet your calcium requirement with diet alone, but a vitamin D supplement is usually necessary to meet this goal.  · When exposed to the sun, use a sunscreen that protects against both UVA and UVB radiation with an SPF of 30 or greater. Reapply every 2 to 3 hours or after sweating, drying off with a towel, or swimming. · Always wear a seat belt when traveling in a car.  Always wear a helmet when riding a bicycle or motorcycle.

## 2020-07-08 NOTE — PROGRESS NOTES
Medicare Annual Wellness Visit  Name: Vahid Antoine Date: 2020   MRN: [id number] Sex: Female   Age: 76 y.o. Ethnicity: Non-/Non    : 1945 Race: Reshma Jaimes is here for Medicare AWV; 6 Month Follow-Up; Hypertension; and Insomnia (Xanax not helping with insomnia--does help with anxiety)    Screenings for behavioral, psychosocial and functional/safety risks, and cognitive dysfunction are all negative except as indicated below. These results, as well as other patient data from the [address] form, are documented in Flowsheets linked to this Encounter. Allergies   Allergen Reactions    Poultry Meal Shortness Of Breath and Hives    Demerol Swelling    Dextromethorphan Other (See Comments)    Diovan [Valsartan] Other (See Comments)     cough    Guaifenesin Other (See Comments)    Lidocaine Itching, Swelling and Other (See Comments)     Itching, redness, swelling    Novocain [Procaine] Swelling     Tongue swelling with dental injection    Remeron [Mirtazapine] Other (See Comments)     \"out of control drooling\"    Seasonal     Yellow Dye Other (See Comments) and Swelling         Prior to Visit Medications    Medication Sig Taking? Authorizing Provider   mesalamine (LIALDA) 1.2 g EC tablet Take 2 tablets by mouth daily Yes Historical Provider, MD   hydrOXYzine (VISTARIL) 25 MG capsule Take 1 capsule by mouth nightly as needed (insomnia) Yes Charlene Torres MD   escitalopram (LEXAPRO) 20 MG tablet Take 1 tablet by mouth daily Yes Charlene Torres MD   acyclovir (ZOVIRAX) 5 % ointment Apply topically every 3 hours Apply topically every 3 hours. while awake Yes Charlene Torres MD   ALPRAZolam Jennet Sorrow) 1 MG tablet TAKE 1 TABLET BY MOUTH NIGHTLY AS NEEDED FOR SLEEP OR ANXIETY Yes Charlene Torres MD   aspirin (EQ ASPIRIN ADULT LOW DOSE) 81 MG EC tablet Take 1 tablet by mouth daily Yes Charlene Torres MD   atorvastatin (LIPITOR) 40 MG tablet TAKE ONE TABLET BY MOUTH ONCE DAILY Yes Sybil Johnson MD   lisinopril (PRINIVIL;ZESTRIL) 10 MG tablet Take 1 tablet by mouth daily Yes Sybil Johnson MD   nitroGLYCERIN (NITROSTAT) 0.4 MG SL tablet Place 1 tablet under the tongue every 5 minutes as needed for Chest pain Yes Sybil Johnson MD   carvedilol (COREG) 6.25 MG tablet Take 6.25 mg by mouth 2 times daily (with meals) Yes Historical Provider, MD   apixaban (ELIQUIS) 5 MG TABS tablet Take by mouth 2 times daily Yes Historical Provider, MD   Multiple Vitamins-Minerals (THERA M PLUS) TABS Take 1 tablet by mouth daily Yes Historical Provider, MD         Past Medical History:   Diagnosis Date    CAD (coronary artery disease)     Depression     H/O emotional problems     Panic Disorder    HSV (herpes simplex virus) infection     Hyperlipidemia     Hypertension     Insomnia     Stroke [facility])        Past Surgical History:   Procedure Laterality Date     SECTION   and [address]    CORONARY ARTERY BYPASS GRAFT  2017    CABG x 4    DIAGNOSTIC CARDIAC CATH LAB PROCEDURE      TONSILLECTOMY  1965         Family History   Problem Relation Age of Onset    Hypertension Mother     Stroke Mother     Heart Disease Father     High Blood Pressure Brother        CareTeam (Including outside providers/suppliers regularly involved in providing care):   Patient Care Team:  Sybil Johnson MD as PCP - General (Family Medicine)  Sybil Johnson MD as PCP - [facility] Empaneled Provider  Miroslava Geiger DO as Physician (Cardiology)  Charmaine Payne DO as Physician (Internal Medicine)  Gordon Kang MD as Consulting Physician (Gastroenterology)    Wt Readings from Last 3 Encounters:   20 152 lb 14.4 oz (69.4 kg)   19 171 lb 1.6 oz (77.6 kg)   19 132 lb 6.4 oz (60.1 kg)     Vitals:    20 1515   BP: 116/74   Site: Left Upper Arm   Position: Sitting   Cuff Size: Medium Adult   Pulse: 60   Resp: 16   Temp: 97.5 °F (36.4 °C)   TempSrc: Temporal Weight: 152 lb 14.4 oz (69.4 kg)   Height: 5' 3\" (1.6 m)     Body mass index is 27.09 kg/m². Based upon direct observation of the patient, evaluation of cognition reveals recent memory intact, remote memory impaired. General Appearance: alert and oriented to person, place and time, well developed and well- nourished, in no acute distress  Skin: warm and dry, no rash or erythema  Head: normocephalic and atraumatic  Eyes: pupils equal, round, and reactive to light, extraocular eye movements intact, conjunctivae normal  ENT: tympanic membrane, external ear and ear canal normal bilaterally, nose without deformity, nasal mucosa and turbinates normal without polyps  Neck: supple and non-tender without mass, no thyromegaly or thyroid nodules, no cervical lymphadenopathy  Pulmonary/Chest: clear to auscultation bilaterally- no wheezes, rales or rhonchi, normal air movement, no respiratory distress  Cardiovascular: normal rate, regular rhythm, normal S1 and S2, no murmurs, rubs, clicks, or gallops, distal pulses intact, no carotid bruits  Abdomen: soft, non-tender, non-distended, normal bowel sounds, no masses or organomegaly  Extremities: no cyanosis, clubbing or edema  Musculoskeletal: normal range of motion, no joint swelling, deformity or tenderness  Neurologic: reflexes normal and symmetric, no cranial nerve deficit, gait, coordination and speech normal    Patient's complete Health Risk Assessment and screening values have been reviewed and are found in Flowsheets. The following problems were reviewed today and where indicated follow up appointments were made and/or referrals ordered. Positive Risk Factor Screenings with Interventions:     Fall Risk:  Timed Up and Go Test > 12 seconds?  (Complete if either Fall Risk answers are Yes): no  2 or more falls in past year?: (!) yes  Fall with injury in past year?: no  Fall Risk Interventions:    · Patient declines any further evaluation/treatment for this issue    Depression:  PHQ-2 Score: 5  PHQ-9 Total Score: 15  Depression Screening Interpretation: (!) PHQ-9 Score 15-19: Moderately Severe Depression  Depression Interventions:  · Relaxation techniques discussed    Health Habits/Nutrition:  Health Habits/Nutrition  Do you exercise for at least 20 minutes 2-3 times per week?: (!) No  Have you lost any weight without trying in the past 3 months?: No  Do you eat fewer than 2 meals per day?: No  Have you seen a dentist within the past year?: Yes(getting new dentures)  Body mass index is 27.09 kg/m². Health Habits/Nutrition Interventions:  · none. Hearing/Vision:  No exam data present  Hearing/Vision  Do you or your family notice any trouble with your hearing?: (!) Yes(\"little\")  Do you have difficulty driving, watching TV, or doing any of your daily activities because of your eyesight?: (!) Yes(yes, due to last stroke)  Have you had an eye exam within the past year?: Yes  Hearing/Vision Interventions:  · none. Safety:  Safety  Do you have working smoke detectors?: Yes  Have all throw rugs been removed or fastened?: Yes  Do you have non-slip mats or surfaces in all bathtubs/showers?: Yes  Do all of your stairways have a railing or banister?: Yes  Are your doorways, halls and stairs free of clutter?: Yes  Do you always fasten your seatbelt when you are in a car?: (!) No  Safety Interventions:  · none. ADL:  ADLs  In the past 7 days, did you need help from others to perform any of the following everyday activities? Eating, dressing, grooming, bathing, toileting, or walking/balance?: (!) Walking/Balance  In the past 7 days, did you need help from others to take care of any of the following? Laundry, housekeeping, banking/finances, shopping, telephone use, food preparation, transportation, or taking medications?: (!) None, Transportation(spouse drives patient)  ADL Interventions:  · none.     Personalized Preventive Plan   Current Health Maintenance Status  Immunization History   Administered Date(s) Administered    Hib, unspecified 11/23/2010    Influenza Whole 11/11/2010    Influenza, Triv, inactivated, subunit, adjuvanted, IM (Fluad 65 yrs and older) 11/11/2019    MMR 11/23/2010    Pneumococcal Conjugate 13-valent ([id number]) 11/23/2010    Pneumococcal Polysaccharide ([id number]) 11/04/2010    Td, unspecified formulation 11/04/2010    Tdap (Boostrix, Adacel) 05/30/2018    Zoster Live (Zostavax) 12/04/2015        Health Maintenance   Topic Date Due    Hepatitis C screen  1945    Colon cancer screen colonoscopy  08/12/1995    Shingles Vaccine (2 of 3) 01/29/2016    Breast cancer screen  12/04/2017    Lipid screen  06/22/2018    Annual Wellness Visit (AWV)  05/29/2019    Flu vaccine (1) 09/01/2020    Potassium monitoring  03/23/2021    Creatinine monitoring  03/23/2021    DTaP/Tdap/Td vaccine (2 - Td) 05/30/2028    DEXA (modify frequency per FRAX score)  Completed    Pneumococcal 65+ years Vaccine  Completed    Hepatitis A vaccine  Aged Out    Hepatitis B vaccine  Aged Out    Hib vaccine  Aged Out    Meningococcal (ACWY) vaccine  Aged Out     Recommendations for Unafinance Due: see orders and patient instructions/AVS.  . Recommended screening schedule for the next 5-10 years is provided to the patient in written form: see Patient Instructions/AVS.    Marry Sinha was seen today for medicare awv, 6 month follow-up, hypertension and insomnia. Diagnoses and all orders for this visit:    Subjective:      Patient ID: Bernie Soto is a 76 y.o. female. HPI  Encounter Diagnoses   Name Primary?  Encounter for screening mammogram for breast cancer     Routine general medical examination at a health care facility Yes    Primary insomnia      Patient is here for her annual Medicare wellness exam but also is having increasing difficulty with insomnia and would like a sleeping agent to try.   After discussion, it was decided to try hydroxyzine 25 mg at bedtime which could be increased to 50 mg if needed. They will notify the office as to her response. Review of Systems   Psychiatric/Behavioral: Positive for sleep disturbance. Objective:   Physical Exam  Vitals signs and nursing note reviewed. Psychiatric:         Attention and Perception: Attention normal.         Cognition and Memory: Cognition is impaired. Comments: Patient is status post CVA which has affected her cognition and memory retention. This is also been associated with \"nights and days being switched\" therefore the problem with insomnia. Assessment:       Diagnosis Orders   1. Routine general medical examination at a health care facility     2. Encounter for screening mammogram for breast cancer     3. Primary insomnia  hydrOXYzine (VISTARIL) 25 MG capsule           Plan:      No orders of the defined types were placed in this encounter. There are no discontinued medications. Current Outpatient Medications   Medication Sig Dispense Refill    mesalamine (LIALDA) 1.2 g EC tablet Take 2 tablets by mouth daily      hydrOXYzine (VISTARIL) 25 MG capsule Take 1 capsule by mouth nightly as needed (insomnia) 30 capsule 0    escitalopram (LEXAPRO) 20 MG tablet Take 1 tablet by mouth daily 90 tablet 1    acyclovir (ZOVIRAX) 5 % ointment Apply topically every 3 hours Apply topically every 3 hours. while awake 30 g 1    ALPRAZolam (XANAX) 1 MG tablet TAKE 1 TABLET BY MOUTH NIGHTLY AS NEEDED FOR SLEEP OR ANXIETY 30 tablet 5    aspirin (EQ ASPIRIN ADULT LOW DOSE) 81 MG EC tablet Take 1 tablet by mouth daily 90 tablet 3    atorvastatin (LIPITOR) 40 MG tablet TAKE ONE TABLET BY MOUTH ONCE DAILY 90 tablet 3    lisinopril (PRINIVIL;ZESTRIL) 10 MG tablet Take 1 tablet by mouth daily 90 tablet 3    nitroGLYCERIN (NITROSTAT) 0.4 MG SL tablet Place 1 tablet under the tongue every 5 minutes as needed for Chest pain 25 tablet 1    carvedilol (COREG) 6.25 MG tablet Take 6.25 mg by mouth 2 times daily (with meals)      apixaban (ELIQUIS) 5 MG TABS tablet Take by mouth 2 times daily      Multiple Vitamins-Minerals (THERA M PLUS) TABS Take 1 tablet by mouth daily       No current facility-administered medications for this visit. Use vistaril 25 mg at bedtime for sleep. Continue present medications.             Michelle Puente MD

## 2020-08-11 ENCOUNTER — TELEPHONE (OUTPATIENT)
Dept: FAMILY MEDICINE CLINIC | Age: 75
End: 2020-08-11

## 2020-08-11 RX ORDER — ALPRAZOLAM 1 MG/1
1 TABLET ORAL NIGHTLY PRN
Qty: 90 TABLET | Refills: 1 | Status: SHIPPED | OUTPATIENT
Start: 2020-08-11 | End: 2021-02-08 | Stop reason: SDUPTHER

## 2020-08-11 NOTE — TELEPHONE ENCOUNTER
Fax received from [facility] requesting a refill on the patients alprazolam 1mg. Please advise.

## 2020-09-28 RX ORDER — ATORVASTATIN CALCIUM 40 MG/1
TABLET, FILM COATED ORAL
Qty: 90 TABLET | Refills: 3 | Status: SHIPPED | OUTPATIENT
Start: 2020-09-28 | End: 2021-07-15 | Stop reason: SDUPTHER

## 2020-10-12 RX ORDER — LISINOPRIL 10 MG/1
TABLET ORAL
Qty: 90 TABLET | Refills: 3 | Status: SHIPPED | OUTPATIENT
Start: 2020-10-12 | End: 2021-08-23 | Stop reason: SDUPTHER

## 2020-11-12 RX ORDER — HYDROXYZINE PAMOATE 25 MG/1
25 CAPSULE ORAL NIGHTLY PRN
Qty: 90 CAPSULE | Refills: 1 | Status: SHIPPED | OUTPATIENT
Start: 2020-11-12 | End: 2021-03-30

## 2020-11-24 RX ORDER — CARVEDILOL 6.25 MG/1
6.25 TABLET ORAL 2 TIMES DAILY WITH MEALS
Qty: 180 TABLET | Refills: 3 | Status: SHIPPED | OUTPATIENT
Start: 2020-11-24 | End: 2021-08-04 | Stop reason: SDUPTHER

## 2020-11-24 NOTE — TELEPHONE ENCOUNTER
Requested Prescriptions     Pending Prescriptions Disp Refills    carvedilol (COREG) 6.25 MG tablet 60 tablet      Sig: Take 1 tablet by mouth 2 times daily (with meals)

## 2021-02-08 DIAGNOSIS — F41.0 PANIC DISORDER: ICD-10-CM

## 2021-02-08 DIAGNOSIS — I25.10 CORONARY ARTERY DISEASE INVOLVING NATIVE HEART WITHOUT ANGINA PECTORIS, UNSPECIFIED VESSEL OR LESION TYPE: ICD-10-CM

## 2021-02-08 RX ORDER — ALPRAZOLAM 1 MG/1
1 TABLET ORAL NIGHTLY PRN
Qty: 90 TABLET | Refills: 1 | Status: SHIPPED | OUTPATIENT
Start: 2021-02-08 | End: 2021-07-20 | Stop reason: SDUPTHER

## 2021-02-08 RX ORDER — ASPIRIN 81 MG/1
81 TABLET ORAL DAILY
Qty: 90 TABLET | Refills: 3 | Status: SHIPPED | OUTPATIENT
Start: 2021-02-08 | End: 2021-11-04

## 2021-03-30 ENCOUNTER — OFFICE VISIT (OUTPATIENT)
Dept: FAMILY MEDICINE CLINIC | Age: 76
End: 2021-03-30
Payer: MEDICARE

## 2021-03-30 VITALS
OXYGEN SATURATION: 98 % | HEART RATE: 65 BPM | WEIGHT: 175.9 LBS | DIASTOLIC BLOOD PRESSURE: 84 MMHG | RESPIRATION RATE: 14 BRPM | BODY MASS INDEX: 31.16 KG/M2 | TEMPERATURE: 97.3 F | SYSTOLIC BLOOD PRESSURE: 120 MMHG

## 2021-03-30 DIAGNOSIS — I63.02 THROMBOTIC STROKE INVOLVING BASILAR ARTERY (HCC): ICD-10-CM

## 2021-03-30 DIAGNOSIS — Z95.1 S/P CABG X 4: ICD-10-CM

## 2021-03-30 DIAGNOSIS — F41.0 PANIC DISORDER: ICD-10-CM

## 2021-03-30 DIAGNOSIS — I25.10 CORONARY ARTERY DISEASE INVOLVING NATIVE HEART WITHOUT ANGINA PECTORIS, UNSPECIFIED VESSEL OR LESION TYPE: ICD-10-CM

## 2021-03-30 DIAGNOSIS — E78.5 HYPERLIPIDEMIA, UNSPECIFIED HYPERLIPIDEMIA TYPE: ICD-10-CM

## 2021-03-30 DIAGNOSIS — F51.01 PRIMARY INSOMNIA: ICD-10-CM

## 2021-03-30 DIAGNOSIS — I10 ESSENTIAL HYPERTENSION: Primary | ICD-10-CM

## 2021-03-30 PROCEDURE — G8417 CALC BMI ABV UP PARAM F/U: HCPCS | Performed by: NURSE PRACTITIONER

## 2021-03-30 PROCEDURE — 99214 OFFICE O/P EST MOD 30 MIN: CPT | Performed by: NURSE PRACTITIONER

## 2021-03-30 PROCEDURE — G8484 FLU IMMUNIZE NO ADMIN: HCPCS | Performed by: NURSE PRACTITIONER

## 2021-03-30 PROCEDURE — G8400 PT W/DXA NO RESULTS DOC: HCPCS | Performed by: NURSE PRACTITIONER

## 2021-03-30 PROCEDURE — 3017F COLORECTAL CA SCREEN DOC REV: CPT | Performed by: NURSE PRACTITIONER

## 2021-03-30 PROCEDURE — 1123F ACP DISCUSS/DSCN MKR DOCD: CPT | Performed by: NURSE PRACTITIONER

## 2021-03-30 PROCEDURE — G8427 DOCREV CUR MEDS BY ELIG CLIN: HCPCS | Performed by: NURSE PRACTITIONER

## 2021-03-30 PROCEDURE — 4040F PNEUMOC VAC/ADMIN/RCVD: CPT | Performed by: NURSE PRACTITIONER

## 2021-03-30 PROCEDURE — 1036F TOBACCO NON-USER: CPT | Performed by: NURSE PRACTITIONER

## 2021-03-30 PROCEDURE — 1090F PRES/ABSN URINE INCON ASSESS: CPT | Performed by: NURSE PRACTITIONER

## 2021-03-30 NOTE — PROGRESS NOTES
Subjective:      Patient ID: John Brown 1945 is a 76 y.o. female here for evaluation. Chief Complaint   Patient presents with    6 Month Follow-Up     CAD, CVA     Hypertension    Orders     2 parking placard        Patient Active Problem List   Diagnosis    HTN (hypertension)    Hyperlipidemia    OA (osteoarthritis), hands and feet    AR (allergic rhinitis)    Insomnia, chronic    Panic disorder    Esophageal spasm    Medication monitoring encounter    Depression    Contusion of knee, right    Sleep walking    Foot pain, right    Postmenopausal state    Hair thinning    Oral herpes simplex infection    Polycythemia    Thrombotic stroke involving basilar artery ([address]    Coronary artery disease involving native heart without angina pectoris    S/P CABG x 4    Amaurosis fugax of right eye    History of CVA in adulthood    Non-STEMI (non-ST elevated myocardial infarction) ([address]    Obesity    Occipital scalp laceration    [address] (subconjunctival hemorrhage)    SDH (subdural hematoma) (HCC)    Traumatic intracranial subdural hematoma with brief loss of consciousness       Dr. Manuel Bowser Sham - [facility]    Hx of CVA. Residual issues with balance and vision as a result. On Eliquis due to the thrombotic stroke hx. CABG x 4. Denies CP, SOB or chest tightness. On Coreg ,    Sleep stable with Xanax 1 mg. Denies hangover effect. Complains of daily fatigue. Not exercising or walking due to pandemic. Weight gain. Vitals:    03/30/21 1503   BP: 120/84   Pulse: 65   Resp: 14   Temp: 97.3 °F (36.3 °C)   SpO2: 98%      BP wnl. On Lisinopril and Coreg. LIPID followed with [facility]. On Lipitor 40 mg.      No results found for: LABA1C  No results found for: EAG    No components found for: CHLPL  Lab Results   Component Value Date    TRIG 196 (H) 06/22/2017     Lab Results   Component Value Date    HDL 42 06/22/2017     Lab Results   Component Value Date LDLCALC 82 06/22/2017     Lab Results   Component Value Date    LABVLDL 39 (H) 06/22/2017         Chemistry        Component Value Date/Time     01/12/2021 1433    K 4.7 01/12/2021 1433     01/12/2021 1433    CO2 26 01/12/2021 1433    BUN 10 01/12/2021 1433    CREATININE 0.7 01/12/2021 1433        Component Value Date/Time    CALCIUM 10.0 01/12/2021 1433    ALKPHOS 94 01/12/2021 1433    AST 19 01/12/2021 1433    ALT 22 01/12/2021 1433    BILITOT 0.5 01/12/2021 1433            No results found for: TSH, W8LZQGO, Q2FVIMS, THYROIDAB    Lab Results   Component Value Date    WBC 7.4 01/12/2021    HGB 15.8 01/12/2021    HCT 47.2 (H) 01/12/2021    MCV 88.7 01/12/2021     01/12/2021         Health Maintenance   Topic Date Due    Hepatitis C screen  Never done    COVID-19 Vaccine (1) Never done    Colon cancer screen colonoscopy  Never done    Shingles Vaccine (2 of 3) 01/29/2016    Lipid screen  06/22/2018    Annual Wellness Visit (AWV)  07/09/2021    Potassium monitoring  01/12/2022    Creatinine monitoring  01/12/2022    DTaP/Tdap/Td vaccine (2 - Td) 05/30/2028    DEXA (modify frequency per FRAX score)  Completed    Flu vaccine  Completed    Pneumococcal 65+ years Vaccine  Completed    Hepatitis A vaccine  Aged Out    Hepatitis B vaccine  Aged Out    Hib vaccine  Aged Out    Meningococcal (ACWY) vaccine  Aged [employer] History   Administered Date(s) Administered    Hib, unspecified 11/23/2010    Influenza Whole 11/11/2010    Influenza, High-dose, Quadv, 65 yrs +, IM (Fluzone) 10/30/2020    Influenza, Triv, inactivated, subunit, adjuvanted, IM (Fluad 65 yrs and older) 11/11/2019    MMR 11/23/2010    Pneumococcal Conjugate 13-valent (Ruzthwd43) 11/23/2010    Pneumococcal Polysaccharide (Countukru36) 11/04/2010    Td, unspecified formulation 11/04/2010    Tdap (Boostrix, Adacel) 05/30/2018    Zoster Live (Zostavax) 12/04/2015       Review of Systems   Constitutional: Positive for fatigue. HENT: Negative. Eyes: Positive for visual disturbance. Blurred vision secondary to post stroke syndrome. Respiratory: Negative. Cardiovascular: Negative. Negative for chest pain, palpitations and leg swelling. Gastrointestinal: Negative. Endocrine: Negative. Genitourinary: Negative. Musculoskeletal: Positive for gait problem. Skin: Negative. Allergic/Immunologic: Negative. Neurological: Positive for weakness. Negative for dizziness, syncope, speech difficulty, light-headedness and headaches. Hematological: Negative. Psychiatric/Behavioral: Positive for sleep disturbance. The patient is nervous/anxious. All other systems reviewed and are negative. Objective:   Physical Exam  Vitals signs and nursing note reviewed. Constitutional:       Appearance: Normal appearance. HENT:      Right Ear: Tympanic membrane, ear canal and external ear normal.      Left Ear: Tympanic membrane, ear canal and external ear normal.      Nose: Nose normal.   Eyes:      General: No visual field deficit. Extraocular Movements: Extraocular movements intact. Conjunctiva/sclera: Conjunctivae normal.      Pupils: Pupils are equal, round, and reactive to light. Neck:      Musculoskeletal: No muscular tenderness. Vascular: No carotid bruit. Cardiovascular:      Rate and Rhythm: Normal rate and regular rhythm. Pulses: Normal pulses. Heart sounds: No murmur. No gallop. Pulmonary:      Effort: Pulmonary effort is normal.      Breath sounds: Normal breath sounds. Abdominal:      General: Bowel sounds are normal.   Musculoskeletal:         General: No swelling. Right lower leg: No edema. Left lower leg: No edema. Skin:     General: Skin is warm and dry. Capillary Refill: Capillary refill takes less than 2 seconds. Neurological:      Mental Status: She is alert and oriented to person, place, and time.       Motor: Weakness present. Coordination: Coordination is intact. Gait: Gait abnormal.      Comments: Patient is unsteady on her feet secondary to her vertigo which is secondary to her stroke. Psychiatric:         Mood and Affect: Mood normal.          Assessment:       Diagnosis Orders   1. Essential hypertension     2. Hyperlipidemia, unspecified hyperlipidemia type     3. Coronary artery disease involving native heart without angina pectoris, unspecified vessel or lesion type  Handicap Placard MISC    Handicap Placard MISC   4. Panic disorder  Handicap Placard MISC    Handicap Placard MISC   5. Primary insomnia     6. S/P CABG x 4  Handicap Placard MISC    Handicap Placard MISC   7. Thrombotic stroke involving basilar artery (HCC)  Handicap Placard MISC    Handicap Placard MISC           Plan:      Chronic conditions stable  Labs reviewed - get labs from [facility]  Refills as above  Follow up with Special[facility]  Immunizations discussed - set up for COVID vaccine  RTO in 6 months          Current Outpatient Medications   Medication Sig Dispense Refill    Handicap Placard MISC by Does not apply route Expires 3/2026 1 each 0    Handicap Placard MISC by Does not apply route Expires 3/2026 1 each 0    ALPRAZolam (XANAX) 1 MG tablet Take 1 tablet by mouth nightly as needed for Sleep for up to 180 days.  90 tablet 1    aspirin (EQ ASPIRIN ADULT LOW DOSE) 81 MG EC tablet Take 1 tablet by mouth daily 90 tablet 3    carvedilol (COREG) 6.25 MG tablet Take 1 tablet by mouth 2 times daily (with meals) 180 tablet 3    lisinopril (PRINIVIL;ZESTRIL) 10 MG tablet TAKE 1 TABLET EVERY DAY 90 tablet 3    atorvastatin (LIPITOR) 40 MG tablet TAKE 1 TABLET EVERY DAY 90 tablet 3    mesalamine (LIALDA) 1.2 g EC tablet Take 2 tablets by mouth daily      nitroGLYCERIN (NITROSTAT) 0.4 MG SL tablet Place 1 tablet under the tongue every 5 minutes as needed for Chest pain 25 tablet 1    apixaban (ELIQUIS) 5 MG TABS tablet Take by mouth 2 times daily      Multiple Vitamins-Minerals (THERA M PLUS) TABS Take 1 tablet by mouth daily       No current facility-administered medications for this visit.

## 2021-03-30 NOTE — PROGRESS NOTES
Creatinine monitoring  01/12/2022    DTaP/Tdap/Td vaccine (2 - Td) 05/30/2028    DEXA (modify frequency per FRAX score)  Completed    Flu vaccine  Completed    Pneumococcal 65+ years Vaccine  Completed    Hepatitis A vaccine  Aged Out    Hepatitis B vaccine  Aged Out    Hib vaccine  Aged Out    Meningococcal (ACWY) vaccine  Aged Out

## 2021-03-31 ASSESSMENT — ENCOUNTER SYMPTOMS
RESPIRATORY NEGATIVE: 1
GASTROINTESTINAL NEGATIVE: 1
ALLERGIC/IMMUNOLOGIC NEGATIVE: 1

## 2021-04-08 ENCOUNTER — IMMUNIZATION (OUTPATIENT)
Dept: PRIMARY CARE CLINIC | Age: 76
End: 2021-04-08
Payer: MEDICARE

## 2021-04-08 PROCEDURE — 0001A COVID-19, PFIZER VACCINE 30MCG/0.3ML DOSE: CPT

## 2021-04-08 PROCEDURE — 91300 COVID-19, PFIZER VACCINE 30MCG/0.3ML DOSE: CPT

## 2021-04-29 ENCOUNTER — IMMUNIZATION (OUTPATIENT)
Dept: PRIMARY CARE CLINIC | Age: 76
End: 2021-04-29
Payer: MEDICARE

## 2021-04-29 PROCEDURE — 0002A COVID-19, PFIZER VACCINE 30MCG/0.3ML DOSE: CPT | Performed by: FAMILY MEDICINE

## 2021-04-29 PROCEDURE — 91300 COVID-19, PFIZER VACCINE 30MCG/0.3ML DOSE: CPT | Performed by: FAMILY MEDICINE

## 2021-06-01 ENCOUNTER — TELEPHONE (OUTPATIENT)
Dept: FAMILY MEDICINE CLINIC | Age: 76
End: 2021-06-01

## 2021-06-01 NOTE — TELEPHONE ENCOUNTER
[facility] Transitions Initial Follow Up Call    Outreach made within 2 business days of discharge: Yes    Patient: Jocelynn Oliver Patient : 1945   MRN: [id number]  Reason for Admission: No discharge information exists for this patient. Discharge Date:         Spoke with: attempted to contact pt NA, no VM. Discharge department/facility: [facility]    Scheduled appointment with PCP within 7-14 days    Follow Up  Future Appointments   Date Time Provider Leonarda Howe   2021  3:15 PM Jessi Adam, APRN - [address]      Pt will also need to be scheduled for a [facility] follow up DC 21.

## 2021-06-01 NOTE — TELEPHONE ENCOUNTER
[facility] Transitions Initial Follow Up Call    Outreach made within 2 business days of discharge: Yes    Patient: Aureliano Keys Patient : 1945   MRN: [id number]  Reason for Admission: No discharge information exists for this patient. Discharge Date:  2021      Spoke with:  Shanika Meyer on HIPAA    Discharge department/facility: [facility] Patient Contact:  Was patient able to fill all prescriptions: no, filling these today  Was patient instructed to bring all medications to the follow-up visit: Yes  Is patient taking all medications as directed in the discharge summary?  Yes  Does patient understand their discharge instructions: Yes  Does patient have questions or concerns that need addressed prior to 7-14 day follow up office visit: no    Scheduled appointment with PCP within 7-14 days    Follow Up  Future Appointments   Date Time Provider Leonarda Howe   2021  3:15 PM Raffi Cruz, [address] ([address] [place] [address])

## 2021-06-07 ENCOUNTER — VIRTUAL VISIT (OUTPATIENT)
Dept: FAMILY MEDICINE CLINIC | Age: 76
End: 2021-06-07
Payer: MEDICARE

## 2021-06-07 DIAGNOSIS — S00.03XD CONTUSION OF SCALP, SUBSEQUENT ENCOUNTER: Primary | ICD-10-CM

## 2021-06-07 DIAGNOSIS — R42 POSTURAL LIGHTHEADEDNESS: ICD-10-CM

## 2021-06-07 DIAGNOSIS — Z95.1 S/P CABG X 4: ICD-10-CM

## 2021-06-07 DIAGNOSIS — I65.22 CAROTID STENOSIS, ASYMPTOMATIC, LEFT: ICD-10-CM

## 2021-06-07 DIAGNOSIS — E78.5 HYPERLIPIDEMIA, UNSPECIFIED HYPERLIPIDEMIA TYPE: ICD-10-CM

## 2021-06-07 DIAGNOSIS — I25.10 CORONARY ARTERY DISEASE INVOLVING NATIVE HEART WITHOUT ANGINA PECTORIS, UNSPECIFIED VESSEL OR LESION TYPE: ICD-10-CM

## 2021-06-07 DIAGNOSIS — R26.81 GAIT INSTABILITY: ICD-10-CM

## 2021-06-07 DIAGNOSIS — Z86.73 HISTORY OF CVA IN ADULTHOOD: ICD-10-CM

## 2021-06-07 DIAGNOSIS — I10 ESSENTIAL HYPERTENSION: ICD-10-CM

## 2021-06-07 DIAGNOSIS — Z91.81 AT HIGH RISK FOR FALLS: ICD-10-CM

## 2021-06-07 PROCEDURE — 1111F DSCHRG MED/CURRENT MED MERGE: CPT | Performed by: NURSE PRACTITIONER

## 2021-06-07 PROCEDURE — 99496 TRANSJ CARE MGMT HIGH F2F 7D: CPT | Performed by: NURSE PRACTITIONER

## 2021-06-07 NOTE — PROGRESS NOTES
Bill For Dosimetry/Render Treatment Time Calculation In Note: No Post-Discharge Transitional Care Management Services or Hospital Follow Up      Joshua Castillo   YOB: 1945    Date of Office Visit:  6/7/2021  Date of Hospital Admission:    Date of Hospital Discharge:    Readmission Risk Score(high >=14%.  Medium >=10%):No data recorded    Care management risk score Rising risk (score 2-5) and Complex Care (Scores >=6): 4     Non face to face  following discharge, date last encounter closed (first attempt may have been earlier): 6/1/2021 11:54 AM 6/1/2021 11:54 AM    Call initiated 2 business days of discharge: Yes     Patient Active Problem List   Diagnosis    HTN (hypertension)    Hyperlipidemia    OA (osteoarthritis), hands and feet    AR (allergic rhinitis)    Insomnia, chronic    Panic disorder    Esophageal spasm    Medication monitoring encounter    Depression    Contusion of knee, right    Sleep walking    Foot pain, right    Postmenopausal state    Hair thinning    Oral herpes simplex infection    Polycythemia    Thrombotic stroke involving basilar artery ([address]    Coronary artery disease involving native heart without angina pectoris    S/P CABG x 4    Amaurosis fugax of right eye    History of CVA in adulthood    Non-STEMI (non-ST elevated myocardial infarction) ([address]    Obesity    Occipital scalp laceration    [address] (subconjunctival hemorrhage)    SDH (subdural hematoma) (HCC)    Traumatic intracranial subdural hematoma with brief loss of consciousness       Allergies   Allergen Reactions    Poultry Meal Shortness Of Breath and Hives    Chicken Allergy Hives    Demerol Swelling    Dextromethorphan Other (See Comments)    Diovan [Valsartan] Other (See Comments)     cough    Guaifenesin Other (See Comments)    Lidocaine Itching, Swelling and Other (See Comments)     Itching, redness, swelling    Novocain [Procaine] Swelling     Tongue swelling with dental injection    Remeron [Mirtazapine] Other (See Comments)     \"out of control drooling\"    Seasonal     Yellow Dye Other (See Comments) and Swelling       Medications listed as ordered at the time of discharge from hospital   Roly Iglesias Medication Instructions KARI:    Printed on:06/08/21 0310   Medication Information                      ALPRAZolam (XANAX) 1 MG tablet  Take 1 tablet by mouth nightly as needed for Sleep for up to 180 days. apixaban (ELIQUIS) 5 MG TABS tablet  Take by mouth 2 times daily             aspirin (EQ ASPIRIN ADULT LOW DOSE) 81 MG EC tablet  Take 1 tablet by mouth daily             atorvastatin (LIPITOR) 40 MG tablet  TAKE 1 TABLET EVERY DAY             carvedilol (COREG) 6.25 MG tablet  Take 1 tablet by mouth 2 times daily (with meals)             Handicap Placard MISC  by Does not apply route Expires 3/2026             Handicap Placard MISC  by Does not apply route Expires 3/2026             lisinopril (PRINIVIL;ZESTRIL) 10 MG tablet  TAKE 1 TABLET EVERY DAY             mesalamine (LIALDA) 1.2 g EC tablet  Take 2 tablets by mouth daily             Multiple Vitamins-Minerals (THERA M PLUS) TABS  Take 1 tablet by mouth daily             nitroGLYCERIN (NITROSTAT) 0.4 MG SL tablet  Place 1 tablet under the tongue every 5 minutes as needed for Chest pain                   Medications marked \"taking\" at this time  No outpatient medications have been marked as taking for the 6/7/21 encounter (Virtual Visit) with PAT Mariano CNP. Medications patient taking as of now reconciled against medications ordered at time of hospital discharge: Yes    Chief Complaint   Patient presents with    Follow-Up from Hospital       HPI    Inpatient course: Discharge summary reviewed- see chart. Pt states she got out of the shower, lost her balance, and fell. Pt  reports having issues with balance and states she falls frequently. Was [facility] at [facility] and kept overnight for observation. CT Head and Pelvis completed.   Scalp Field Size (Applicator): 10.0 cm hematoma with no active bleeds. US Carotid completed with right ICA 40% and left ICA 50-69%. Hx of CVA. Multiple falls. Complains of lightheadedness with position changes. Denies room spinning. Vision will get dark and will feel week. Most recent fall was related to trip. Lightheadedness affecting her ADLs. Eliquis is on hold due to frequent falls. Follows with [facility] Dr. Edilberto Fermin. Discussion on hospital on tilt table test OP. Will see [facility] next week for follow up. Patient Active Problem List   Diagnosis    HTN (hypertension)    Hyperlipidemia    OA (osteoarthritis), hands and feet    AR (allergic rhinitis)    Insomnia, chronic    Panic disorder    Esophageal spasm    Medication monitoring encounter    Depression    Contusion of knee, right    Sleep walking    Foot pain, right    Postmenopausal state    Hair thinning    Oral herpes simplex infection    Polycythemia    Thrombotic stroke involving basilar artery ([facility]    Coronary artery disease involving native heart without angina pectoris    S/P CABG x 4    Amaurosis fugax of right eye    History of CVA in adulthood    Non-STEMI (non-ST elevated myocardial infarction) ([facility])    Obesity    Occipital scalp laceration    [address] (subconjunctival hemorrhage)    SDH (subdural hematoma) ([facility])    Traumatic intracranial subdural hematoma with brief loss of consciousness         Review of Systems   Constitutional: Negative for chills and fever. HENT: Negative. Respiratory: Negative for cough and shortness of breath. Cardiovascular: Negative for chest pain. Gastrointestinal: Negative for abdominal pain and nausea. Musculoskeletal: Positive for gait problem. Skin: Positive for wound (left forehead hematoma). Negative for rash. Neurological: Positive for weakness and light-headedness. Negative for dizziness and headaches. Psychiatric/Behavioral: Negative. There were no vitals filed for this visit. Fractions / Week: 3 There is no height or weight on file to calculate BMI. Wt Readings from Last 3 Encounters:   03/30/21 175 lb 14.4 oz (79.8 kg)   07/08/20 152 lb 14.4 oz (69.4 kg)   11/11/19 171 lb 1.6 oz (77.6 kg)     BP Readings from Last 3 Encounters:   03/30/21 120/84   07/08/20 116/74   11/11/19 116/78       Physical Exam  Constitutional:       General: She is not in acute distress. Appearance: She is not ill-appearing. Pulmonary:      Effort: Pulmonary effort is normal. No respiratory distress. Neurological:      Mental Status: She is alert and oriented to person, place, and time. Psychiatric:         Mood and Affect: Mood normal.         Behavior: Behavior normal.             Assessment/Plan:   Diagnosis Orders   1. Contusion of scalp, subsequent encounter  MA DISCHARGE MEDS RECONCILED W/ CURRENT OUTPATIENT MED LIST   2. Postural lightheadedness     3. Gait instability     4. Carotid stenosis, asymptomatic, left     5. History of CVA in adulthood     6. Coronary artery disease involving native heart without angina pectoris, unspecified vessel or lesion type     7. S/P CABG x 4     8. Essential hypertension     9. Hyperlipidemia, unspecified hyperlipidemia type     10. At high risk for falls         MDM:   Imaging and labs reviewed. US Carotid shows 50-69% stenosis with hx of CVA. Follow up with [facility] next week to discuss. Likely Tilt Table OP with [facility].    Continue to hold blood thinner per [facility].  KRYSTLE      Medical Decision Making: high complexity Treatment Time / Fractionation (Optional- Include Units) Rx 2: 0.47 Fractionation Number: 2 Custom Shielding Afterword Text Will Not Be Included With Simple Simulations (X X Y Cm............): port to correlate with the lesion size, including treatment margin. The custom lead shield is adequate to accommodate the appropriate applicator and provide adequate shielding around the treatment site. Additional shielding (as noted below) is used to protect sensitive, normal tissues. Port Dimensions-Y Axis In Cm: 0.7 Ssd In Cm (Optional): 15 Daily Fractionated Dose (Optional- Include Units) Rx 2: 290.46 Fractions / Week Rx 4: 5 Limb Positioning Or Elevation: headrest semi elevated Treatment Margins In Cm: 0.3 Custom Shielding Preamble Text Will Not Be Included With Simple Simulations (.......... X X Y Cm): A lead shield of 0.762 mm thickness is utilized to form a molded, custom shield with a Dose Per Fractionation In Cgy (Optional): 1795.68 Number Of Days Off Treatment: 1 Energy (Include Units): 100kV Total Dose (Optional-Please Include Units): 1795.68cGy Dimensions-Y Axis In Cm: 0.1 Bill For Radiation Treatment: Yes Intro Statement (Will Not Render If Left Blank): The patient is undergoing superficial radiation therapy for skin cancer and presents for weekly evaluation and management.  Per protocol and as documented on the flow sheet, the patient was questioned as to subjective redness, pruritus, pain, drainage, fatigue, or any other symptoms.  Objectively, the radiation area was evaluated with regards to erythema, atrophy, scale, crusting, erosion, ulceration, edema, purpura, tenderness, warmth, drainage, and any other findings.  The plan was extensively reviewed including the dose, and dosing schedule.  The simulation and clinical setup was also reviewed as was the external and any internal shields and based on this review the appropriateness and sufficiency of treatment was determined. Port Dimensions-X Axis In Cm: 7 Time Dose Fractionation (Optional- Include Units If Applicable) Rx 2: 25 Treatment Device Design After Initial Simulation Justification (Will Render If Bill For Treatment Devices = Yes): The patient is status post radiation simulation and is evaluated as to the use of additional devices for shielding and placement for radiation therapy. Fractionation Number (Evaluation): 20 Shielding Size (Optional- Include Units): 7x0.7 Dimensions-X Axis In Cm: 6.4 Assessment: Appropriate reaction Depth (Optional-Please Include Units) Rx 2: 1.58 Treatment Time / Fractionation (Optional- Include Units): 2.32 Functional Status: 0 (fully active) Field Size (Applicator) Rx 2: 2.5 cm Simple Simulation Afterword Text Will Be Included With Simple Simulations (Indications............): The patient had a complete consultation regarding all applicable modalities for the treatment of their skin cancer and based on a variety of factors including the type of tumor, size, and location, the relevant medical history as well as local tissue factors, the functional status of the individual, the ability to perform necessary postoperative wound instructions and the need for simultaneous treatments as well as overall wound healing status, it was determined that the patient would begin radiation therapy treatment for skin cancer.  A full simulation and treatment device design was performed including the determination and formulation of appropriate simple and complex devices including lead shield of 0.762 mm thickness to form molded customized shielding to specifically correlate with the lesion size including treatment margin.  The custom lead shield is adequate to accommodate the appropriate applicator and provide adequate shielding around the treatment site.  The specific field applicator, shields, and devices both simple and complex as well as the specific patient setup is outlined below.  The patient was given a full consent for superficial radiation to both verbally and in writing and the full determination of patient's eligibility for treatment and selection is outlined on the patient eligibility and treatment selection form.  The specific superficial radiotherapy prescription was determined and was documented on the superficial radiotherapy prescription form.  A treatment calculation was also performed and documented on the treatment calculation form.  Based on the prescription, the patient was scheduled for a series of fractional treatments. Cumulative Dose In Cgy (Optional): 1197.12 Total Dose (Optional-Please Include Units) Rx 2: 1452.3cGy Daily Fractionated Dose (Optional- Include Units): 598.56cGy Simple Simulation Preamble Text Will Be Included With Simple Simulations (.......... Indications): Simple simulation was performed today for the following reasons: Detail Level: Detailed Information: Selecting Yes will display possible errors in your note based on the variables you have selected. This validation is only offered as a suggestion for you. PLEASE NOTE THAT THE VALIDATION TEXT WILL BE REMOVED WHEN YOU FINALIZE YOUR NOTE. IF YOU WANT TO FAX A PRELIMINARY NOTE YOU WILL NEED TO TOGGLE THIS TO 'NO' IF YOU DO NOT WANT IT IN YOUR FAXED NOTE. Dose / Tx In Cgy (Optional): 598.56 Energy (Optional-Please Include Units) Rx 2: 70kV Patient Positioning: Supine Shielding Size (Optional- Include Units) Rx 2: 2.0x2.0

## 2021-06-08 ASSESSMENT — ENCOUNTER SYMPTOMS
COUGH: 0
ABDOMINAL PAIN: 0
SHORTNESS OF BREATH: 0
NAUSEA: 0

## 2021-07-15 DIAGNOSIS — I25.10 CORONARY ARTERY DISEASE INVOLVING NATIVE HEART WITHOUT ANGINA PECTORIS, UNSPECIFIED VESSEL OR LESION TYPE: ICD-10-CM

## 2021-07-15 RX ORDER — ATORVASTATIN CALCIUM 40 MG/1
TABLET, FILM COATED ORAL
Qty: 90 TABLET | Refills: 3 | Status: SHIPPED | OUTPATIENT
Start: 2021-07-15 | End: 2022-06-06

## 2021-07-15 NOTE — TELEPHONE ENCOUNTER
Requested Prescriptions     Pending Prescriptions Disp Refills    atorvastatin (LIPITOR) 40 MG tablet 90 tablet 3     Sig: TAKE 1 TABLET EVERY DAY       Provider change.

## 2021-07-20 DIAGNOSIS — F41.0 PANIC DISORDER: ICD-10-CM

## 2021-07-20 RX ORDER — ALPRAZOLAM 1 MG/1
1 TABLET ORAL NIGHTLY PRN
Qty: 90 TABLET | Refills: 1 | Status: SHIPPED | OUTPATIENT
Start: 2021-07-20 | End: 2022-01-09 | Stop reason: SDUPTHER

## 2021-08-04 RX ORDER — CARVEDILOL 6.25 MG/1
6.25 TABLET ORAL 2 TIMES DAILY WITH MEALS
Qty: 180 TABLET | Refills: 3 | Status: SHIPPED | OUTPATIENT
Start: 2021-08-04 | End: 2022-05-19

## 2021-08-04 NOTE — TELEPHONE ENCOUNTER
Requested Prescriptions     Pending Prescriptions Disp Refills    carvedilol (COREG) 6.25 MG tablet 180 tablet 3     Sig: Take 1 tablet by mouth 2 times daily (with meals)       Provider change.

## 2021-08-23 DIAGNOSIS — I25.10 CORONARY ARTERY DISEASE INVOLVING NATIVE HEART WITHOUT ANGINA PECTORIS, UNSPECIFIED VESSEL OR LESION TYPE: ICD-10-CM

## 2021-08-23 RX ORDER — LISINOPRIL 10 MG/1
TABLET ORAL
Qty: 90 TABLET | Refills: 3 | Status: SHIPPED | OUTPATIENT
Start: 2021-08-23 | End: 2022-06-13

## 2021-11-03 DIAGNOSIS — I25.10 CORONARY ARTERY DISEASE INVOLVING NATIVE HEART WITHOUT ANGINA PECTORIS, UNSPECIFIED VESSEL OR LESION TYPE: ICD-10-CM

## 2021-11-04 RX ORDER — ASPIRIN 81 MG/1
TABLET, COATED ORAL
Qty: 90 TABLET | Refills: 3 | Status: SHIPPED | OUTPATIENT
Start: 2021-11-04

## 2021-12-15 DIAGNOSIS — B00.2 ORAL HERPES SIMPLEX INFECTION: ICD-10-CM

## 2021-12-15 NOTE — TELEPHONE ENCOUNTER
The patients  called and is requesting a refill on her zovrax 5% ointment to be sent to [facility] . Order pended for your signature.       If no call back they will know that the script was sent into [facility]

## 2021-12-16 RX ORDER — ACYCLOVIR 50 MG/G
OINTMENT TOPICAL
Qty: 30 G | Refills: 1 | Status: SHIPPED | OUTPATIENT
Start: 2021-12-16

## 2021-12-17 ENCOUNTER — TELEPHONE (OUTPATIENT)
Dept: FAMILY MEDICINE CLINIC | Age: 76
End: 2021-12-17

## 2021-12-20 NOTE — TELEPHONE ENCOUNTER
Attempted to notify pt, left message with  Kami Valdivia to have pt return our call.  is NOT on HIPAA.

## 2022-01-07 DIAGNOSIS — F41.0 PANIC DISORDER: ICD-10-CM

## 2022-01-09 RX ORDER — ALPRAZOLAM 1 MG/1
1 TABLET ORAL NIGHTLY PRN
Qty: 90 TABLET | Refills: 1 | Status: SHIPPED | OUTPATIENT
Start: 2022-01-09 | End: 2022-07-18

## 2022-02-08 RX ORDER — VALACYCLOVIR HYDROCHLORIDE 1 G/1
TABLET, FILM COATED ORAL
Qty: 12 TABLET | Refills: 3 | Status: CANCELLED | OUTPATIENT
Start: 2022-02-08

## 2022-02-08 RX ORDER — VALACYCLOVIR HYDROCHLORIDE 1 G/1
TABLET, FILM COATED ORAL
Qty: 12 TABLET | Refills: 3 | Status: SHIPPED | OUTPATIENT
Start: 2022-02-08 | End: 2022-04-04

## 2022-02-08 NOTE — TELEPHONE ENCOUNTER
Patient asking for an oral script to [address] to treat cold sore in her nose since topical was denied.

## 2022-03-28 ENCOUNTER — OFFICE VISIT (OUTPATIENT)
Dept: FAMILY MEDICINE CLINIC | Age: 77
End: 2022-03-28
Payer: MEDICARE

## 2022-03-28 ENCOUNTER — TELEPHONE (OUTPATIENT)
Dept: FAMILY MEDICINE CLINIC | Age: 77
End: 2022-03-28

## 2022-03-28 VITALS
HEIGHT: 63 IN | SYSTOLIC BLOOD PRESSURE: 132 MMHG | HEART RATE: 72 BPM | BODY MASS INDEX: 31.16 KG/M2 | DIASTOLIC BLOOD PRESSURE: 76 MMHG | RESPIRATION RATE: 18 BRPM

## 2022-03-28 DIAGNOSIS — Z86.73 HISTORY OF CVA IN ADULTHOOD: ICD-10-CM

## 2022-03-28 DIAGNOSIS — Z00.00 MEDICARE ANNUAL WELLNESS VISIT, SUBSEQUENT: Primary | ICD-10-CM

## 2022-03-28 DIAGNOSIS — F43.23 ADJUSTMENT DISORDER WITH MIXED ANXIETY AND DEPRESSED MOOD: ICD-10-CM

## 2022-03-28 DIAGNOSIS — I25.10 CORONARY ARTERY DISEASE INVOLVING NATIVE HEART WITHOUT ANGINA PECTORIS, UNSPECIFIED VESSEL OR LESION TYPE: ICD-10-CM

## 2022-03-28 DIAGNOSIS — G47.00 INSOMNIA, UNSPECIFIED TYPE: ICD-10-CM

## 2022-03-28 DIAGNOSIS — I65.22 CAROTID STENOSIS, ASYMPTOMATIC, LEFT: ICD-10-CM

## 2022-03-28 DIAGNOSIS — R26.81 GAIT INSTABILITY: ICD-10-CM

## 2022-03-28 DIAGNOSIS — Z95.1 S/P CABG X 4: ICD-10-CM

## 2022-03-28 DIAGNOSIS — E78.5 HYPERLIPIDEMIA, UNSPECIFIED HYPERLIPIDEMIA TYPE: ICD-10-CM

## 2022-03-28 DIAGNOSIS — R73.01 IFG (IMPAIRED FASTING GLUCOSE): ICD-10-CM

## 2022-03-28 DIAGNOSIS — I10 ESSENTIAL HYPERTENSION: ICD-10-CM

## 2022-03-28 PROBLEM — R55 SYNCOPE: Status: ACTIVE | Noted: 2022-03-28

## 2022-03-28 PROBLEM — S05.12XA TRAUMATIC PERIORBITAL ECCHYMOSIS OF LEFT EYE: Status: ACTIVE | Noted: 2022-03-28

## 2022-03-28 PROBLEM — K91.89 POSTOPERATIVE ILEUS (HCC): Status: ACTIVE | Noted: 2022-03-28

## 2022-03-28 PROBLEM — S09.90XA INJURY OF HEAD: Status: ACTIVE | Noted: 2022-03-28

## 2022-03-28 PROBLEM — Q43.3 CONGENITAL MALROTATION OF INTESTINE: Status: ACTIVE | Noted: 2022-03-28

## 2022-03-28 PROBLEM — J18.9 RIGHT LOWER LOBE PNEUMONIA: Status: ACTIVE | Noted: 2022-03-28

## 2022-03-28 PROBLEM — K29.80 DUODENITIS: Status: ACTIVE | Noted: 2022-03-28

## 2022-03-28 PROBLEM — I16.1 HYPERTENSIVE EMERGENCY: Status: ACTIVE | Noted: 2022-03-28

## 2022-03-28 PROBLEM — R90.89 ABNORMAL COMPUTERIZED TOMOGRAPHY OF BRAIN: Status: ACTIVE | Noted: 2022-03-28

## 2022-03-28 PROBLEM — S70.00XA CONTUSION OF HIP AND THIGH: Status: ACTIVE | Noted: 2022-03-28

## 2022-03-28 PROBLEM — I69.391 DYSPHAGIA DUE TO RECENT CEREBRAL INFARCTION: Status: ACTIVE | Noted: 2022-03-28

## 2022-03-28 PROBLEM — S06.0XAA CONCUSSION: Status: ACTIVE | Noted: 2022-03-28

## 2022-03-28 PROBLEM — K56.7 POSTOPERATIVE ILEUS (HCC): Status: ACTIVE | Noted: 2022-03-28

## 2022-03-28 PROBLEM — F41.9 ANXIETY IN CANCER PATIENT: Status: ACTIVE | Noted: 2022-03-28

## 2022-03-28 PROBLEM — S70.10XA CONTUSION OF HIP AND THIGH: Status: ACTIVE | Noted: 2022-03-28
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
## 2022-03-28 PROBLEM — E63.9 NUTRITIONAL DEFICIENCY: Status: ACTIVE | Noted: 2022-03-28

## 2022-03-28 PROBLEM — I67.82 CEREBRAL ISCHEMIA: Status: ACTIVE | Noted: 2022-03-28

## 2022-03-28 PROBLEM — D62 POSTOPERATIVE ANEMIA DUE TO ACUTE BLOOD LOSS: Status: ACTIVE | Noted: 2022-03-28

## 2022-03-28 PROBLEM — B37.49 CANDIDAL URINARY TRACT INFECTION: Status: ACTIVE | Noted: 2022-03-28

## 2022-03-28 PROCEDURE — 4040F PNEUMOC VAC/ADMIN/RCVD: CPT | Performed by: NURSE PRACTITIONER

## 2022-03-28 PROCEDURE — G8417 CALC BMI ABV UP PARAM F/U: HCPCS | Performed by: NURSE PRACTITIONER

## 2022-03-28 PROCEDURE — 1090F PRES/ABSN URINE INCON ASSESS: CPT | Performed by: NURSE PRACTITIONER

## 2022-03-28 PROCEDURE — G0439 PPPS, SUBSEQ VISIT: HCPCS | Performed by: NURSE PRACTITIONER

## 2022-03-28 PROCEDURE — 1036F TOBACCO NON-USER: CPT | Performed by: NURSE PRACTITIONER

## 2022-03-28 PROCEDURE — G8400 PT W/DXA NO RESULTS DOC: HCPCS | Performed by: NURSE PRACTITIONER

## 2022-03-28 PROCEDURE — G8484 FLU IMMUNIZE NO ADMIN: HCPCS | Performed by: NURSE PRACTITIONER

## 2022-03-28 PROCEDURE — G8427 DOCREV CUR MEDS BY ELIG CLIN: HCPCS | Performed by: NURSE PRACTITIONER

## 2022-03-28 PROCEDURE — 1123F ACP DISCUSS/DSCN MKR DOCD: CPT | Performed by: NURSE PRACTITIONER

## 2022-03-28 PROCEDURE — 99214 OFFICE O/P EST MOD 30 MIN: CPT | Performed by: NURSE PRACTITIONER

## 2022-03-28 RX ORDER — HYDROXYZINE HYDROCHLORIDE 25 MG/1
25 TABLET, FILM COATED ORAL NIGHTLY PRN
Qty: 30 TABLET | Refills: 0 | Status: SHIPPED | OUTPATIENT
Start: 2022-03-28 | End: 2022-04-18 | Stop reason: SDUPTHER

## 2022-03-28 SDOH — ECONOMIC STABILITY: FOOD INSECURITY: WITHIN THE PAST 12 MONTHS, THE FOOD YOU BOUGHT JUST DIDN'T LAST AND YOU DIDN'T HAVE MONEY TO GET MORE.: NEVER TRUE

## 2022-03-28 SDOH — ECONOMIC STABILITY: FOOD INSECURITY: WITHIN THE PAST 12 MONTHS, YOU WORRIED THAT YOUR FOOD WOULD RUN OUT BEFORE YOU GOT MONEY TO BUY MORE.: NEVER TRUE

## 2022-03-28 ASSESSMENT — ENCOUNTER SYMPTOMS
ALLERGIC/IMMUNOLOGIC NEGATIVE: 1
GASTROINTESTINAL NEGATIVE: 1
RESPIRATORY NEGATIVE: 1

## 2022-03-28 ASSESSMENT — PATIENT HEALTH QUESTIONNAIRE - PHQ9
SUM OF ALL RESPONSES TO PHQ QUESTIONS 1-9: 0
1. LITTLE INTEREST OR PLEASURE IN DOING THINGS: 0
2. FEELING DOWN, DEPRESSED OR HOPELESS: 0
SUM OF ALL RESPONSES TO PHQ QUESTIONS 1-9: 0
SUM OF ALL RESPONSES TO PHQ9 QUESTIONS 1 & 2: 0
SUM OF ALL RESPONSES TO PHQ QUESTIONS 1-9: 0
SUM OF ALL RESPONSES TO PHQ QUESTIONS 1-9: 0

## 2022-03-28 ASSESSMENT — LIFESTYLE VARIABLES: HOW OFTEN DO YOU HAVE A DRINK CONTAINING ALCOHOL: NEVER

## 2022-03-28 ASSESSMENT — SOCIAL DETERMINANTS OF HEALTH (SDOH): HOW HARD IS IT FOR YOU TO PAY FOR THE VERY BASICS LIKE FOOD, HOUSING, MEDICAL CARE, AND HEATING?: NOT HARD AT ALL

## 2022-03-28 NOTE — PROGRESS NOTES
Medicare Annual Wellness Visit    Jada Rubio is here for Medicare AWV    Assessment & Plan   Medicare annual wellness visit, subsequent  Insomnia, unspecified type  -     hydrOXYzine (ATARAX) 25 MG tablet; Take 1 tablet by mouth nightly as needed (SLEEP), Disp-30 tablet, R-0Normal  Adjustment disorder with mixed anxiety and depressed mood  Coronary artery disease involving native heart without angina pectoris, unspecified vessel or lesion type  S/P CABG x 4  Carotid stenosis, asymptomatic, left  Essential hypertension  Hyperlipidemia, unspecified hyperlipidemia type  -     CBC; Future  -     Lipid Panel; Future  -     Comprehensive Metabolic Panel; Future  History of CVA in adulthood  Gait instability  IFG (impaired fasting glucose)  -     Hemoglobin A1C; Future      Recommendations for Preventive Services Due: see orders and patient instructions/AVS.  Recommended screening schedule for the next 5-10 years is provided to the patient in written form: see Patient Instructions/AVS.     Return in 6 months (on 9/28/2022) for Medicare Annual Wellness Visit in 1 year. Subjective     Patient's complete Health Risk Assessment and screening values have been reviewed and are found in Flowsheets. The following problems were reviewed today and where indicated follow up appointments were made and/or referrals ordered.     Positive Risk Factor Screenings with Interventions:    Fall Risk:  Do you feel unsteady or are you worried about falling? : no  2 or more falls in past year?: no  Fall with injury in past year?: (!) yes     Fall Risk Interventions:    · Home safety tips provided              General Health and ACP:  General  In general, how would you say your health is?: (!) Poor  In the past 7 days, have you experienced any of the following: New or Increased Pain, New or Increased Fatigue, Loneliness, Social Isolation, Stress or Anger?: (!) Yes  Select all that apply: (!) Loneliness,Social Isolation,Stress,Anger  Do you get the social and emotional support that you need?: (!) No  Do you have a Living Will?: Yes    Advance Directives     Power of [place] Will ACP-Advance Directive ACP-Power of     Not on File Not on File Not on File Not on File      General Health Risk Interventions:  · Loneliness: patient declines any further intervention for this issue  · Social isolation: patient declines any further intervention for this issue    Health Habits/Nutrition:     Physical Activity: Insufficiently Active    Days of Exercise per Week: 1 day    Minutes of Exercise per Session: 20 min     Have you lost any weight without trying in the past 3 months?: No     Have you seen the dentist within the past year?: Yes    Health Habits/Nutrition Interventions:  · Inadequate physical activity:  educational materials provided to promote increased physical activity    Hearing/Vision:  Do you or your family notice any trouble with your hearing that hasn't been managed with hearing aids?: (!) Yes  Do you have difficulty driving, watching TV, or doing any of your daily activities because of your eyesight?: (!) Yes  Have you had an eye exam within the past year?: Yes  No exam data present    Hearing/Vision Interventions:  · Vision concerns:  patient encouraged to make appointment with his/her eye specialist    Safety:  Do you have working smoke detectors?: Yes  Do you have any tripping hazards - loose or unsecured carpets or rugs?: No  Do you have any tripping hazards - clutter in doorways, halls, or stairs?: No  Do you have either shower bars, grab bars, non-slip mats or non-slip surfaces in your shower or bathtub?: Yes  Do all of your stairways have a railing or banister?: Yes  Do you always fasten your seatbelt when you are in a car?: (!) No    Safety Interventions:  · Home safety tips provided           Objective   Vitals:    03/28/22 1436   BP: 132/76   Site: Right Upper Arm   Position: Sitting   Cuff Size: Large Adult   Pulse: 72 Resp: 18   Height: 5' 3\" (1.6 m)      Body mass index is 31.16 kg/m². Allergies   Allergen Reactions    Poultry Meal Shortness Of Breath and Hives    Chicken Allergy Hives    Demerol Swelling    Dextromethorphan Other (See Comments)    Diovan [Valsartan] Other (See Comments)     cough    Guaifenesin Other (See Comments)    Lidocaine Itching, Swelling and Other (See Comments)     Itching, redness, swelling    Novocain [Procaine] Swelling     Tongue swelling with dental injection    Remeron [Mirtazapine] Other (See Comments)     \"out of control drooling\"    Seasonal     Yellow Dye Other (See Comments) and Swelling     Prior to Visit Medications    Medication Sig Taking? Authorizing Provider   hydrOXYzine (ATARAX) 25 MG tablet Take 1 tablet by mouth nightly as needed (SLEEP) Yes PAT Hunter CNP   valACYclovir (VALTREX) 1 g tablet Take 2 tabs at onset of cold sore. Repeat 2 tabs 12 hours later. Yes PAT Hutner CNP   ALPRAZolam [place]) 1 MG tablet Take 1 tablet by mouth nightly as needed for Sleep for up to 180 days. Yes PAT Hunter CNP   acyclovir (ZOVIRAX) 5 % ointment Apply topically every 3 hours Apply topically every 3 hours. while awake Yes PAT Hunter CNP   ASPIRIN LOW DOSE 81 MG EC tablet TAKE 1 TABLET EVERY DAY Yes PAT Hunter CNP   lisinopril (PRINIVIL;ZESTRIL) 10 MG tablet TAKE 1 TABLET EVERY DAY Yes PAT Hutner CNP   carvedilol (COREG) 6.25 MG tablet Take 1 tablet by mouth 2 times daily (with meals) Yes PAT Hunter CNP   atorvastatin (LIPITOR) 40 MG tablet TAKE 1 TABLET EVERY DAY Yes PAT Hunter CNP   Handicap Placard MISC by Does not apply route Expires 3/2026 Yes PAT Hunter CNP   Handicap Placard [id number] [facility] by Does not apply route Expires 3/2026 Yes PAT Hunter CNP   mesalamine (LIALDA) 1.2 g EC tablet Take 2 tablets by mouth daily Yes Historical Provider, MD   nitroGLYCERIN (NITROSTAT) 0.4 MG SL tablet Place 1 tablet under the tongue every 5 minutes as needed for Chest pain Yes Sweetie Siddiqi MD   apixaban (ELIQUIS) 5 MG TABS tablet Take by mouth 2 times daily Yes Historical Provider, MD   Multiple Vitamins-Minerals (THERA M PLUS) TABS Take 1 tablet by mouth daily Yes Historical Provider, MD       [facility] (Including outside providers/suppliers regularly involved in providing care):   Patient Care Team:  PAT Francisco CNP as PCP - General (Family Nurse Practitioner)  PAT Francisco CNP as PCP - REHABILITATION [facility] Empaneled Provider  Codi Morrison DO as Physician (Cardiology)  Ralf Dao DO as Physician (Internal Medicine)  Kena Zimmerman MD as Consulting Physician (Gastroenterology)    Reviewed and updated this visit:  Tobacco  Allergies  Meds  Med Hx  Surg Hx  Soc Hx  Fam Hx

## 2022-03-28 NOTE — PROGRESS NOTES
Subjective:      Patient ID: Mani Lorenzana 1945 is a 68 y.o. female here for evaluation. Chief Complaint   Patient presents with    Medicare AWV       Patient Active Problem List   Diagnosis    HTN (hypertension)    Hyperlipidemia    OA (osteoarthritis), hands and feet    AR (allergic rhinitis)    Insomnia, chronic    Panic disorder    Esophageal spasm    Medication monitoring encounter    Depression    Contusion of knee, right    Sleep walking    Foot pain, right    Postmenopausal state    Hair thinning    Oral herpes simplex infection    Polycythemia    Thrombotic stroke involving basilar artery ([facility]    Coronary artery disease involving native heart without angina pectoris    S/P CABG x 4    Amaurosis fugax of right eye    History of CVA in adulthood    Non-STEMI (non-ST elevated myocardial infarction) ([address]    Obesity    Occipital scalp laceration    [facility] (subconjunctival hemorrhage)    SDH (subdural hematoma) (HCC)    Traumatic intracranial subdural hematoma with brief loss of consciousness    Abnormal computerized tomography of brain    Anxiety in cancer patient    Candidal urinary tract infection    Cerebral ischemia    Concussion    Congenital malrotation of intestine    Contusion of hip and thigh    Duodenitis    Dysphagia due to recent cerebral infarction    Fall from standing    Hypertensive emergency    Nutritional deficiency    Postoperative anemia due to acute blood loss    Postoperative ileus ([address]    Right lower lobe pneumonia    Sepsis syndrome    Syncope    Injury of head    Traumatic periorbital ecchymosis of left eye       Dr. Maye Agudelo [place] - [facility]STR[facility]    Hx of CVA. Residual issues with balance and vision as a result. On Eliquis due to the thrombotic stroke hx. CABG x 4. Denies CP, SOB or chest tightness. On Coreg, Lisinopril 10 mg and ASA. Sleep stable with Xanax 1 mg. Poor getting to sleep.   Denies hangover effect. Complains of daily fatigue. Not exercising or walking due to pandemic[place]. WIll be starting a garden. Vitals:    03/28/22 1436   BP: 132/76   Pulse: 72   Resp: 18        BP wnl. On Lisinopril and Coreg. LIPID followed with CARDIO. On Lipitor 40 mg.      No results found for: LABA1C  No results found for: EAG    No components found for: CHLPL  Lab Results   Component Value Date    TRIG 196 (H) 06/22/2017     Lab Results   Component Value Date    HDL 42 06/22/2017     Lab Results   Component Value Date    LDLCALC 82 06/22/2017     Lab Results   Component Value Date    LABVLDL 39 (H) 06/22/2017         Chemistry        Component Value Date/Time     01/12/2021 1433    K 4.7 01/12/2021 1433     01/12/2021 1433    CO2 26 01/12/2021 1433    BUN 10 01/12/2021 1433    CREATININE 0.7 01/12/2021 1433        Component Value Date/Time    CALCIUM 10.0 01/12/2021 1433    ALKPHOS 94 01/12/2021 1433    AST 19 01/12/2021 1433    ALT 22 01/12/2021 1433    BILITOT 0.5 01/12/2021 1433            No results found for: TSH, J3WVMQA, X2GLWQM, THYROIDAB    Lab Results   Component Value Date    WBC 7.4 01/12/2021    HGB 15.8 01/12/2021    HCT 47.2 (H) 01/12/2021    MCV 88.7 01/12/2021     01/12/2021         Health Maintenance   Topic Date Due    Hepatitis C screen  Never done    Shingles Vaccine (2 of 3) 01/29/2016    Lipid screen  06/22/2018    Depression Monitoring  07/08/2021    Annual Wellness Visit (AWV)  07/09/2021    Flu vaccine (1) 09/01/2021    COVID-19 Vaccine (3 - Booster for Pfizer series) 09/29/2021    Potassium monitoring  01/12/2022    Creatinine monitoring  01/12/2022    DTaP/Tdap/Td vaccine (2 - Td or Tdap) 05/30/2028    DEXA (modify frequency per FRAX score)  Completed    Pneumococcal 65+ years Vaccine  Completed    Hepatitis A vaccine  Aged Out    Hepatitis B vaccine  Aged Out    Hib vaccine  Aged Out    Meningococcal (ACWY) vaccine  Aged groopify History   Administered Date(s) Administered    COVID-19, groopify top, DILUTE for use, 12+ yrs, 30mcg/0.3mL dose 04/08/2021, 04/29/2021    Hib, unspecified 11/23/2010    Influenza Whole 11/11/2010    Influenza, High-dose, Quadv, 65 yrs +, IM (Fluzone) 10/30/2020    Influenza, Triv, inactivated, subunit, adjuvanted, IM (Fluad 65 yrs and older) 11/11/2019    MMR 11/23/2010    Pneumococcal Conjugate 13-valent ([id number]) 11/23/2010    Pneumococcal Polysaccharide ([id number]) 11/04/2010    Td, unspecified formulation 11/04/2010    Tdap (Boostrix, Adacel) 05/30/2018    Zoster Live (Zostavax) 12/04/2015       Review of Systems   Constitutional: Positive for fatigue. HENT: Negative. Eyes: Positive for visual disturbance. Blurred vision secondary to post stroke syndrome. Respiratory: Negative. Cardiovascular: Negative. Negative for chest pain, palpitations and leg swelling. Gastrointestinal: Negative. Endocrine: Negative. Genitourinary: Negative. Musculoskeletal: Positive for gait problem. Skin: Negative. Allergic/Immunologic: Negative. Neurological: Positive for weakness. Negative for dizziness, syncope, speech difficulty, light-headedness and headaches. Hematological: Negative. Psychiatric/Behavioral: Positive for dysphoric mood and sleep disturbance. The patient is nervous/anxious. All other systems reviewed and are negative. Objective:   Physical Exam  Vitals and nursing note reviewed. Constitutional:       Appearance: Normal appearance. HENT:      Right Ear: Tympanic membrane, ear canal and external ear normal.      Left Ear: Tympanic membrane, ear canal and external ear normal.      Nose: Nose normal.   Eyes:      General: No visual field deficit. Extraocular Movements: Extraocular movements intact. Conjunctiva/sclera: Conjunctivae normal.      Pupils: Pupils are equal, round, and reactive to light. Neck:      Vascular: No carotid bruit. Cardiovascular:      Rate and Rhythm: Normal rate and regular rhythm. Pulses: Normal pulses. Heart sounds: No murmur heard. No gallop. Pulmonary:      Effort: Pulmonary effort is normal.      Breath sounds: Normal breath sounds. Abdominal:      General: Bowel sounds are normal.   Musculoskeletal:         General: No swelling. Cervical back: No muscular tenderness. Right lower leg: No edema. Left lower leg: No edema. Skin:     General: Skin is warm and dry. Capillary Refill: Capillary refill takes less than 2 seconds. Neurological:      Mental Status: She is alert and oriented to person, place, and time. Motor: Weakness present. Coordination: Coordination is intact. Gait: Gait abnormal.   Psychiatric:         Mood and Affect: Mood normal.          Assessment:       Diagnosis Orders   1. Medicare annual wellness visit, subsequent     2. Insomnia, unspecified type  hydrOXYzine (ATARAX) 25 MG tablet   3. Adjustment disorder with mixed anxiety and depressed mood     4. Coronary artery disease involving native heart without angina pectoris, unspecified vessel or lesion type     5. S/P CABG x 4     6. Carotid stenosis, asymptomatic, left     7. Essential hypertension     8. Hyperlipidemia, unspecified hyperlipidemia type     9. History of CVA in adulthood     8. Gait instability             Plan:      Chronic conditions stable  Labs as above  Refills as above   - hydroxyzine 25 mg HS for sleep in addition to Xanax  Follow up with Specialists  Immunizations discussed   Staying active discussed for physical/mental health  RTO in 6 months          Current Outpatient Medications   Medication Sig Dispense Refill    hydrOXYzine (ATARAX) 25 MG tablet Take 1 tablet by mouth nightly as needed (SLEEP) 30 tablet 0    valACYclovir (VALTREX) 1 g tablet Take 2 tabs at onset of cold sore. Repeat 2 tabs 12 hours later.  12 tablet 3    ALPRAZolam (XANAX) 1 MG tablet Take 1 tablet by mouth nightly as needed for Sleep for up to 180 days. 90 tablet 1    acyclovir (ZOVIRAX) 5 % ointment Apply topically every 3 hours Apply topically every 3 hours. while awake 30 g 1    ASPIRIN LOW DOSE 81 MG EC tablet TAKE 1 TABLET EVERY DAY 90 tablet 3    lisinopril (PRINIVIL;ZESTRIL) 10 MG tablet TAKE 1 TABLET EVERY DAY 90 tablet 3    carvedilol (COREG) 6.25 MG tablet Take 1 tablet by mouth 2 times daily (with meals) 180 tablet 3    atorvastatin (LIPITOR) 40 MG tablet TAKE 1 TABLET EVERY DAY 90 tablet 3    Handicap Placard MISC by Does not apply route Expires 3/2026 1 each 0    Handicap Placard MISC by Does not apply route Expires 3/2026 1 each 0    mesalamine (LIALDA) 1.2 g EC tablet Take 2 tablets by mouth daily      nitroGLYCERIN (NITROSTAT) 0.4 MG SL tablet Place 1 tablet under the tongue every 5 minutes as needed for Chest pain 25 tablet 1    apixaban (ELIQUIS) 5 MG TABS tablet Take by mouth 2 times daily      Multiple Vitamins-Minerals (THERA M PLUS) TABS Take 1 tablet by mouth daily       No current facility-administered medications for this visit.

## 2022-03-28 NOTE — PATIENT INSTRUCTIONS
Personalized Preventive Plan for Chicho Baptiste - 3/28/2022  Medicare offers a range of preventive health benefits. Some of the tests and screenings are paid in full while other may be subject to a deductible, co-insurance, and/or copay. Some of these benefits include a comprehensive review of your medical history including lifestyle, illnesses that may run in your family, and various assessments and screenings as appropriate. After reviewing your medical record and screening and assessments performed today your provider may have ordered immunizations, labs, imaging, and/or referrals for you. A list of these orders (if applicable) as well as your Preventive Care list are included within your After Visit Summary for your review. Other Preventive Recommendations:    · A preventive eye exam performed by an eye specialist is recommended every 1-2 years to screen for glaucoma; cataracts, macular degeneration, and other eye disorders. · A preventive dental visit is recommended every 6 months. · Try to get at least 150 minutes of exercise per week or 10,000 steps per day on a pedometer . · Order or download the FREE \"Exercise & Physical Activity: Your Everyday Guide\" from [employer]. Call 4-327.955.2833 or search [employer] online. · You need 8064-9714 mg of calcium and 2243-2513 IU of vitamin D per day. It is possible to meet your calcium requirement with diet alone, but a vitamin D supplement is usually necessary to meet this goal.  · When exposed to the sun, use a sunscreen that protects against both UVA and UVB radiation with an SPF of 30 or greater. Reapply every 2 to 3 hours or after sweating, drying off with a towel, or swimming. · Always wear a seat belt when traveling in a car. Always wear a helmet when riding a bicycle or motorcycle.

## 2022-03-28 NOTE — TELEPHONE ENCOUNTER
Called Dr. Brian Piedra office and spoke with Kaila Null and requested the patients most recent labs be faxed to our office.   She is faxing labs from May 2021

## 2022-04-04 RX ORDER — VALACYCLOVIR HYDROCHLORIDE 1 G/1
TABLET, FILM COATED ORAL
Qty: 12 TABLET | Refills: 3 | Status: SHIPPED | OUTPATIENT
Start: 2022-04-04

## 2022-04-18 DIAGNOSIS — G47.00 INSOMNIA, UNSPECIFIED TYPE: ICD-10-CM

## 2022-04-18 RX ORDER — HYDROXYZINE HYDROCHLORIDE 25 MG/1
25 TABLET, FILM COATED ORAL NIGHTLY PRN
Qty: 30 TABLET | Refills: 0 | Status: SHIPPED | OUTPATIENT
Start: 2022-04-18 | End: 2022-05-19

## 2022-04-18 NOTE — TELEPHONE ENCOUNTER
[facility] a [facility] hydroxyzine 25mg cap. Order pended for your signature.

## 2022-05-19 DIAGNOSIS — G47.00 INSOMNIA, UNSPECIFIED TYPE: ICD-10-CM

## 2022-05-19 RX ORDER — CARVEDILOL 6.25 MG/1
TABLET ORAL
Qty: 180 TABLET | Refills: 3 | Status: SHIPPED | OUTPATIENT
Start: 2022-05-19

## 2022-05-19 RX ORDER — HYDROXYZINE HYDROCHLORIDE 25 MG/1
25 TABLET, FILM COATED ORAL NIGHTLY PRN
Qty: 30 TABLET | Refills: 0 | Status: SHIPPED | OUTPATIENT
Start: 2022-05-19 | End: 2022-06-18

## 2022-06-04 DIAGNOSIS — I25.10 CORONARY ARTERY DISEASE INVOLVING NATIVE HEART WITHOUT ANGINA PECTORIS, UNSPECIFIED VESSEL OR LESION TYPE: ICD-10-CM

## 2022-06-06 RX ORDER — ATORVASTATIN CALCIUM 40 MG/1
TABLET, FILM COATED ORAL
Qty: 90 TABLET | Refills: 3 | Status: SHIPPED | OUTPATIENT
Start: 2022-06-06

## 2022-06-12 DIAGNOSIS — I25.10 CORONARY ARTERY DISEASE INVOLVING NATIVE HEART WITHOUT ANGINA PECTORIS, UNSPECIFIED VESSEL OR LESION TYPE: ICD-10-CM

## 2022-06-13 RX ORDER — LISINOPRIL 10 MG/1
TABLET ORAL
Qty: 90 TABLET | Refills: 3 | Status: SHIPPED | OUTPATIENT
Start: 2022-06-13

## 2022-07-16 DIAGNOSIS — F41.0 PANIC DISORDER: ICD-10-CM

## 2022-07-18 RX ORDER — ALPRAZOLAM 1 MG/1
TABLET ORAL
Qty: 90 TABLET | Refills: 1 | Status: SHIPPED | OUTPATIENT
Start: 2022-07-18 | End: 2023-01-14

## 2022-08-16 ENCOUNTER — OFFICE VISIT (OUTPATIENT)
Dept: FAMILY MEDICINE CLINIC | Age: 77
End: 2022-08-16
Payer: MEDICARE

## 2022-08-16 VITALS
HEART RATE: 72 BPM | DIASTOLIC BLOOD PRESSURE: 86 MMHG | SYSTOLIC BLOOD PRESSURE: 122 MMHG | RESPIRATION RATE: 18 BRPM | WEIGHT: 162.1 LBS | HEIGHT: 63 IN | BODY MASS INDEX: 28.72 KG/M2

## 2022-08-16 DIAGNOSIS — H53.9 VISION DISTURBANCE FOLLOWING CEREBROVASCULAR ACCIDENT: Primary | ICD-10-CM

## 2022-08-16 DIAGNOSIS — F41.1 GAD (GENERALIZED ANXIETY DISORDER): ICD-10-CM

## 2022-08-16 DIAGNOSIS — I63.02 THROMBOTIC STROKE INVOLVING BASILAR ARTERY (HCC): ICD-10-CM

## 2022-08-16 DIAGNOSIS — I25.10 CORONARY ARTERY DISEASE INVOLVING NATIVE HEART WITHOUT ANGINA PECTORIS, UNSPECIFIED VESSEL OR LESION TYPE: ICD-10-CM

## 2022-08-16 DIAGNOSIS — H53.8 BLURRY VISION, BILATERAL: ICD-10-CM

## 2022-08-16 DIAGNOSIS — I69.398 VISION DISTURBANCE FOLLOWING CEREBROVASCULAR ACCIDENT: Primary | ICD-10-CM

## 2022-08-16 DIAGNOSIS — H54.3 VISION LOSS, BILATERAL: ICD-10-CM

## 2022-08-16 DIAGNOSIS — R26.81 GAIT INSTABILITY: ICD-10-CM

## 2022-08-16 DIAGNOSIS — I65.22 CAROTID STENOSIS, ASYMPTOMATIC, LEFT: ICD-10-CM

## 2022-08-16 PROCEDURE — G8400 PT W/DXA NO RESULTS DOC: HCPCS | Performed by: NURSE PRACTITIONER

## 2022-08-16 PROCEDURE — 1090F PRES/ABSN URINE INCON ASSESS: CPT | Performed by: NURSE PRACTITIONER

## 2022-08-16 PROCEDURE — G8427 DOCREV CUR MEDS BY ELIG CLIN: HCPCS | Performed by: NURSE PRACTITIONER

## 2022-08-16 PROCEDURE — G8417 CALC BMI ABV UP PARAM F/U: HCPCS | Performed by: NURSE PRACTITIONER

## 2022-08-16 PROCEDURE — 1036F TOBACCO NON-USER: CPT | Performed by: NURSE PRACTITIONER

## 2022-08-16 PROCEDURE — 1123F ACP DISCUSS/DSCN MKR DOCD: CPT | Performed by: NURSE PRACTITIONER

## 2022-08-16 PROCEDURE — 99214 OFFICE O/P EST MOD 30 MIN: CPT | Performed by: NURSE PRACTITIONER

## 2022-08-16 RX ORDER — BUSPIRONE HYDROCHLORIDE 5 MG/1
5 TABLET ORAL 2 TIMES DAILY
Qty: 60 TABLET | Refills: 0 | Status: SHIPPED | OUTPATIENT
Start: 2022-08-16 | End: 2022-09-15

## 2022-08-16 RX ORDER — SULFASALAZINE 500 MG/1
1 TABLET ORAL 3 TIMES DAILY
COMMUNITY
Start: 2022-07-26

## 2022-08-16 ASSESSMENT — ENCOUNTER SYMPTOMS
RESPIRATORY NEGATIVE: 1
GASTROINTESTINAL NEGATIVE: 1
ALLERGIC/IMMUNOLOGIC NEGATIVE: 1

## 2022-08-16 NOTE — PROGRESS NOTES
Subjective:      Patient ID: Hanh Alexander 1945 is a 68 y.o. female here for evaluation. Chief Complaint   Patient presents with    Panic Attack    Insomnia    Other     Balance issues and vision issues       Sleep stable with Xanax 1 mg. Denies hangover effect. Complains of daily fatigue. Panic attacks during the days due to worsening vision. Optometrist told her will be blind and this upset her - this was new to her. Relys on her . Hx of CVA. Residual issues with balance and vision as a result. On Eliquis due to the thrombotic stroke hx. Vision is worsening. Worse in left. Denies acute change to vision. Denies HA, extremities weakness or worsening balance issues. Blurry vision. Outlines of objects up close.    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[facility])    Coronary artery disease involving native heart without angina pectoris    S/P CABG x 4    Amaurosis fugax of right eye    History of CVA in adulthood    Non-STEMI (non-ST elevated myocardial infarction) ([facility])    Obesity    Occipital scalp laceration    [address] (subconjunctival hemorrhage)    SDH (subdural hematoma) ([facility])    Traumatic intracranial subdural hematoma with brief loss of consciousness    Abnormal computerized tomography of brain    Anxiety in cancer patient    Candidal urinary tract infection    Cerebral ischemia    Concussion    Congenital malrotation of intestine    Contusion of hip and thigh    Duodenitis    Dysphagia due to recent cerebral infarction    Fall from standing Hypertensive emergency    Nutritional deficiency    Postoperative anemia due to acute blood loss    Postoperative ileus (HCC)    Right lower lobe pneumonia    Sepsis syndrome    Syncope    Injury of head    Traumatic periorbital ecchymosis of left eye       Dr. Mc Mediate  Dr. Shine [place] - GASTRO    CABG x 4. Denies CP, SOB or chest tightness. On Coreg, Lisinopril 10 mg and ASA. Vitals:    08/16/22 1441   BP: 122/86   Pulse: 72   Resp: 18        BP wnl. On Lisinopril and Coreg. LIPID followed with CARDIO. On Lipitor 40 mg.      No results found for: LABA1C  No results found for: EAG    No components found for: CHLPL  Lab Results   Component Value Date    TRIG 196 (H) 06/22/2017     Lab Results   Component Value Date    HDL 42 06/22/2017     Lab Results   Component Value Date    LDLCALC 82 06/22/2017     Lab Results   Component Value Date    LABVLDL 39 (H) 06/22/2017         Chemistry        Component Value Date/Time     01/12/2021 1433    K 4.7 01/12/2021 1433     01/12/2021 1433    CO2 26 01/12/2021 1433    BUN 10 01/12/2021 1433    CREATININE 0.7 01/12/2021 1433        Component Value Date/Time    CALCIUM 10.0 01/12/2021 1433    ALKPHOS 94 01/12/2021 1433    AST 19 01/12/2021 1433    ALT 22 01/12/2021 1433    BILITOT 0.5 01/12/2021 1433            No results found for: TSH, [id number], B7YZBUU, THYROIDAB    Lab Results   Component Value Date    WBC 7.4 01/12/2021    HGB 15.8 01/12/2021    HCT 47.2 (H) 01/12/2021    MCV 88.7 01/12/2021     01/12/2021         Health Maintenance   Topic Date Due    Hepatitis C screen  Never done    Shingles vaccine (2 of 3) 01/29/2016    Lipids  06/22/2018    COVID-19 Vaccine (3 - Booster for Pfizer series) 09/29/2021    Flu vaccine (1) 09/01/2022    Depression Monitoring  03/28/2023    Annual Wellness Visit (AWV)  03/29/2023    DTaP/Tdap/Td vaccine (2 - Td or Tdap) 05/30/2028    DEXA (modify frequency per FRAX score)  Completed    Pneumococcal 65+ years Vaccine  Completed    Hepatitis A vaccine  Aged Out    Hepatitis B vaccine  Aged Out    Hib vaccine  Aged Out    Meningococcal (ACWY) vaccine  Aged Out       Immunization History   Administered Date(s) Administered    COVID-19, PFIZER PURPLE top, DILUTE for use, (age 15 y+), 30mcg/0.3mL 04/08/2021, 04/29/2021    Hib, unspecified 11/23/2010    Influenza Whole 11/11/2010    Influenza, High-dose, Quadv, 65 yrs +, IM (Fluzone) 10/30/2020    Influenza, Triv, inactivated, subunit, adjuvanted, IM (Fluad 65 yrs and older) 11/11/2019    MMR 11/23/2010    Pneumococcal Conjugate 13-valent ([id number]) 11/23/2010    Pneumococcal Polysaccharide ([id number]) 11/04/2010    Td, unspecified formulation 11/04/2010    Tdap (Boostrix, Adacel) 05/30/2018    Zoster Live (Zostavax) 12/04/2015       Review of Systems   Constitutional:  Positive for fatigue. HENT: Negative. Eyes:  Positive for visual disturbance. Blurred vision secondary to post stroke syndrome. Respiratory: Negative. Cardiovascular: Negative. Negative for chest pain, palpitations and leg swelling. Gastrointestinal: Negative. Endocrine: Negative. Genitourinary: Negative. Musculoskeletal:  Positive for gait problem. Skin: Negative. Allergic/Immunologic: Negative. Neurological:  Positive for weakness. Negative for dizziness, syncope, speech difficulty, light-headedness and headaches. Hematological: Negative. Psychiatric/Behavioral:  Positive for dysphoric mood and sleep disturbance. The patient is nervous/anxious. All other systems reviewed and are negative. Objective:   Physical Exam  Vitals and nursing note reviewed. Constitutional:       Appearance: Normal appearance. HENT:      Right Ear: Tympanic membrane, ear canal and external ear normal.      Left Ear: Tympanic membrane, ear canal and external ear normal.      Nose: Nose normal.   Eyes:      General: No visual field deficit.      Extraocular Movements:

## 2022-12-28 DIAGNOSIS — F41.0 PANIC DISORDER: ICD-10-CM

## 2022-12-28 RX ORDER — ALPRAZOLAM 1 MG/1
TABLET ORAL
Qty: 30 TABLET | Refills: 5 | Status: SHIPPED | OUTPATIENT
Start: 2022-12-28 | End: 2023-06-26

## 2023-03-09 ENCOUNTER — TELEPHONE (OUTPATIENT)
Dept: FAMILY MEDICINE CLINIC | Age: 78
End: 2023-03-09

## 2023-03-09 DIAGNOSIS — F41.0 PANIC DISORDER: ICD-10-CM

## 2023-03-09 RX ORDER — ALPRAZOLAM 1 MG/1
1 TABLET ORAL 2 TIMES DAILY PRN
Qty: 60 TABLET | Refills: 2 | Status: SHIPPED | OUTPATIENT
Start: 2023-03-09 | End: 2023-09-05

## 2023-03-09 NOTE — TELEPHONE ENCOUNTER
Attempted to call  Willian Miller on HIPAA [phone], number is no longer in service. I phoned pt's home# [phone], no answer and unable to leave a vm.

## 2023-03-09 NOTE — TELEPHONE ENCOUNTER
Patient currently taking Xaanx 1 mg HS for sleep. High risk of issues with additional sleep aid on top of Xanax and due to age if add on Sonato. Due to age need to be very carefull. Would recommend starting out with Melatonin 5-10 mg at night in addition to Xanax.

## 2023-03-09 NOTE — TELEPHONE ENCOUNTER
Called and spoke with the patient, she was not sure if she needed anything for sleep and requested to have her  Jass Lung call the office back to get the information. Waiting on Richard's call back.

## 2023-03-09 NOTE — TELEPHONE ENCOUNTER
The patients  returned the call and stated that they have tried Melatonin in the past and it gives her really bad nightmares. Asked him if she takes the Xanax every night and he stated no that they are currently out and have been for a week. He states that when she is able to take the Xanax it works well for her. Asked him why they are out and he stated that if the patient has a panic attack during the day he will give her one then as well. Asked Edita Antonio how often does he have to give the extra Xanax during the day and he states about 3 times a week and that is why they run short at the end of the script. He is requesting the script be written so he can give her one during the day if needed and one at night. Requesting the script be sent to [place] [id number]. Please advise.       Call Edita Antonio back at [phone]

## 2023-04-17 ENCOUNTER — TELEPHONE (OUTPATIENT)
Dept: FAMILY MEDICINE CLINIC | Age: 78
End: 2023-04-17

## 2023-04-17 NOTE — TELEPHONE ENCOUNTER
Called the patient's spouse back and gave him the numbers for [facility] and the [facility]. [facility] Neurology with Tere Carey MD  Both referrals have been accepted and the patient should be able to call to schedule appointments. Explained the referral for the [address] in [place] was made and a copy was mailed to the patient, the number with address is on the referral. Patient's spouse stated understanding and wrote the numbers down and will call to schedule the patient with Psychiatry and Neurology. And will wait for the copy of the referral for [address] [place] [address] to schedule the appointment. Johnny Ortiz also wanted to know if the patient and spouse would be interested in the occupational therapists that [facility] offers that would help with low to no vision, patient's spouse stated that at this time no but will discuss this with patient and family and will let Johnny Ortiz know!

## 2023-04-17 NOTE — TELEPHONE ENCOUNTER
[address] [facility] Psychiatry   [address] [facility] [address], [facility] [address] [address] [place] [address], [address]  Referral was accepted. So they should be able to call and get scheduled. [address] [facility] Neurology - Anup Velasquez MD   [address], [address], [address]   Ph: [phone]    Same with Neuro. Referral was accepted. 3. Referral was made to [facility] in [place]. Copy was mailed out to pt. Number should be at bottom of referral.     [address] has Occupational therapist that helps with low or no vision. Would they like referral to this as well\?

## 2023-04-17 NOTE — TELEPHONE ENCOUNTER
Patient's spouse called stating that during the patient's recent visit, it was discussed that the patient would be referred to a psychiatrist,  a psychologist and a vision center. Patient's spouse stated they have not heard anything from anyone yet or he has missed the call. Would like to know if we can text him the name of the doctors and they're numbers so he can call them? Detail Level: Zone Consent: The patient's consent was obtained including but not limited to risks of crusting, scabbing, blistering, scarring, darker or lighter pigmentary change, recurrence, incomplete removal and infection. Duration Of Freeze Thaw-Cycle (Seconds): 0 Render Post-Care Instructions In Note?: no Post-Care Instructions: I reviewed with the patient in detail post-care instructions. Patient is to wear sunprotection, and avoid picking at any of the treated lesions. Pt may apply Vaseline to crusted or scabbing areas. Total Number Of Aks Treated: [address] [place] [address]

## 2023-05-25 ENCOUNTER — OFFICE VISIT (OUTPATIENT)
Dept: PSYCHIATRY | Age: 78
End: 2023-05-25

## 2023-05-25 DIAGNOSIS — F33.2 MDD (MAJOR DEPRESSIVE DISORDER), RECURRENT SEVERE, WITHOUT PSYCHOSIS (HCC): Primary | ICD-10-CM

## 2023-05-25 DIAGNOSIS — F41.1 GAD (GENERALIZED ANXIETY DISORDER): ICD-10-CM

## 2023-05-25 RX ORDER — ESCITALOPRAM OXALATE 5 MG/1
5 TABLET ORAL DAILY
Qty: 30 TABLET | Refills: 1 | Status: SHIPPED | OUTPATIENT
Start: 2023-05-25

## 2023-05-25 ASSESSMENT — ANXIETY QUESTIONNAIRES
6. BECOMING EASILY ANNOYED OR IRRITABLE: 1
5. BEING SO RESTLESS THAT IT IS HARD TO SIT STILL: 0
GAD7 TOTAL SCORE: 16
4. TROUBLE RELAXING: 3
7. FEELING AFRAID AS IF SOMETHING AWFUL MIGHT HAPPEN: 3
IF YOU CHECKED OFF ANY PROBLEMS ON THIS QUESTIONNAIRE, HOW DIFFICULT HAVE THESE PROBLEMS MADE IT FOR YOU TO DO YOUR WORK, TAKE CARE OF THINGS AT HOME, OR GET ALONG WITH OTHER PEOPLE: VERY DIFFICULT
3. WORRYING TOO MUCH ABOUT DIFFERENT THINGS: 3
2. NOT BEING ABLE TO STOP OR CONTROL WORRYING: 3
1. FEELING NERVOUS, ANXIOUS, OR ON EDGE: 3

## 2023-05-25 ASSESSMENT — PATIENT HEALTH QUESTIONNAIRE - PHQ9
6. FEELING BAD ABOUT YOURSELF - OR THAT YOU ARE A FAILURE OR HAVE LET YOURSELF OR YOUR FAMILY DOWN: 3
2. FEELING DOWN, DEPRESSED OR HOPELESS: 3
SUM OF ALL RESPONSES TO PHQ QUESTIONS 1-9: 20
9. THOUGHTS THAT YOU WOULD BE BETTER OFF DEAD, OR OF HURTING YOURSELF: 0
10. IF YOU CHECKED OFF ANY PROBLEMS, HOW DIFFICULT HAVE THESE PROBLEMS MADE IT FOR YOU TO DO YOUR WORK, TAKE CARE OF THINGS AT HOME, OR GET ALONG WITH OTHER PEOPLE: 2
5. POOR APPETITE OR OVEREATING: 2
SUM OF ALL RESPONSES TO PHQ QUESTIONS 1-9: 20
SUM OF ALL RESPONSES TO PHQ9 QUESTIONS 1 & 2: 6
1. LITTLE INTEREST OR PLEASURE IN DOING THINGS: 3
7. TROUBLE CONCENTRATING ON THINGS, SUCH AS READING THE NEWSPAPER OR WATCHING TELEVISION: 3
3. TROUBLE FALLING OR STAYING ASLEEP: 3
SUM OF ALL RESPONSES TO PHQ QUESTIONS 1-9: 20
4. FEELING TIRED OR HAVING LITTLE ENERGY: 3
8. MOVING OR SPEAKING SO SLOWLY THAT OTHER PEOPLE COULD HAVE NOTICED. OR THE OPPOSITE, BEING SO FIGETY OR RESTLESS THAT YOU HAVE BEEN MOVING AROUND A LOT MORE THAN USUAL: 0
SUM OF ALL RESPONSES TO PHQ QUESTIONS 1-9: 20

## 2023-05-25 ASSESSMENT — COLUMBIA-SUICIDE SEVERITY RATING SCALE - C-SSRS
2. HAVE YOU ACTUALLY HAD ANY THOUGHTS OF KILLING YOURSELF?: NO
1. WITHIN THE PAST MONTH, HAVE YOU WISHED YOU WERE DEAD OR WISHED YOU COULD GO TO SLEEP AND NOT WAKE UP?: NO
6. HAVE YOU EVER DONE ANYTHING, STARTED TO DO ANYTHING, OR PREPARED TO DO ANYTHING TO END YOUR LIFE?: YES
7. DID THIS OCCUR IN THE LAST THREE MONTHS: NO
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
## 2023-06-01 ENCOUNTER — NURSE ONLY (OUTPATIENT)
Dept: LAB | Age: 78
End: 2023-06-01

## 2023-06-01 DIAGNOSIS — R41.3 MEMORY PROBLEM: ICD-10-CM

## 2023-06-01 DIAGNOSIS — M89.9 DISORDER OF BONE, UNSPECIFIED: ICD-10-CM

## 2023-06-01 DIAGNOSIS — H53.40 VISUAL FIELD CUT: ICD-10-CM

## 2023-06-01 DIAGNOSIS — Z86.73 HISTORY OF STROKE: ICD-10-CM

## 2023-06-01 LAB
25(OH)D3 SERPL-MCNC: 36 NG/ML (ref 30–100)
FOLATE SERPL-MCNC: 18.7 NG/ML (ref 4.8–24.2)
VIT B12 SERPL-MCNC: 847 PG/ML (ref 211–911)

## 2023-06-05 ENCOUNTER — HOSPITAL ENCOUNTER (OUTPATIENT)
Dept: CT IMAGING | Age: 78
Discharge: HOME | End: 2023-06-05
Attending: RADIOLOGY

## 2023-06-05 DIAGNOSIS — Z00.6 EXAMINATION FOR NORMAL COMPARISON FOR CLINICAL RESEARCH: ICD-10-CM

## 2023-06-05 LAB — PYRIDOXAL PHOS SERPL-SCNC: 39.9 NMOL/L (ref 20–125)

## 2023-06-06 ENCOUNTER — HOSPITAL ENCOUNTER (OUTPATIENT)
Dept: MRI IMAGING | Age: 78
Discharge: HOME | End: 2023-06-06
Attending: RADIOLOGY

## 2023-06-06 DIAGNOSIS — Z00.6 EXAMINATION FOR NORMAL COMPARISON FOR CLINICAL RESEARCH: ICD-10-CM

## 2023-06-07 DIAGNOSIS — I63.02 THROMBOTIC STROKE INVOLVING BASILAR ARTERY (HCC): Primary | ICD-10-CM

## 2023-06-07 DIAGNOSIS — I10 ESSENTIAL HYPERTENSION: ICD-10-CM

## 2023-06-07 DIAGNOSIS — E78.5 HYPERLIPIDEMIA, UNSPECIFIED HYPERLIPIDEMIA TYPE: ICD-10-CM

## 2023-06-07 DIAGNOSIS — I25.10 CORONARY ARTERY DISEASE INVOLVING NATIVE HEART WITHOUT ANGINA PECTORIS, UNSPECIFIED VESSEL OR LESION TYPE: ICD-10-CM

## 2023-06-07 RX ORDER — ATORVASTATIN CALCIUM 40 MG/1
TABLET, FILM COATED ORAL
Qty: 90 TABLET | Refills: 0 | Status: SHIPPED | OUTPATIENT
Start: 2023-06-07

## 2023-06-07 NOTE — TELEPHONE ENCOUNTER
I wrote on the lab orders to have them done before her next refill. Fast 12 Hours, hi-lited it. I then mailed them to the patient today.

## 2023-06-22 ENCOUNTER — OFFICE VISIT (OUTPATIENT)
Dept: PSYCHIATRY | Age: 78
End: 2023-06-22

## 2023-06-22 DIAGNOSIS — F33.2 MDD (MAJOR DEPRESSIVE DISORDER), RECURRENT SEVERE, WITHOUT PSYCHOSIS (HCC): Primary | ICD-10-CM

## 2023-06-22 DIAGNOSIS — F41.1 GAD (GENERALIZED ANXIETY DISORDER): ICD-10-CM

## 2023-06-22 NOTE — PROGRESS NOTES
[address] [facility] PSYCHIATRY  [facility] [address] [phone]    Progress Note    Patient:  Connor Alexandre  YOB: 1945  PCP:  PAT Montes CNP  Visit Date:  6/22/2023      CC: follow up    SUBJECTIVE:      Connor Alexandre, a 68 y.o. female, presents for a follow up visit with  and provided consent for him to remain in room. Patient reports she is not doing well. Patient is not adherent with medication regimen. Patient reports stopping Lexapro day after she started. Reports it made her feel worse. Continues to endorse depression and cognitive concerns. Patient still very with it and able to do complex math in evaluation. Focused on eyesight and lack of improvement. Reports it makes her feel dumb that she can no longer read.  is helping out a lot at home but this is secondary to eye sight and not cognitive issues. I discussed my concern for depression in relation to physical limitations 2/2 stroke. Patient expresses a desire to have a medication that makes her smarter and improves eyesight. Psychotherapy Note  Time spent in psychotherapy: 20 minutes  Modality: supportive and CBT  Goals: improve insight and decrease depression  Focus: Focused on patients pessimistic outlook on life. Challenged her beliefs that she can no longer do things. Explored cognitive distortions and encouraged use of re-framing. Limited progress. Will continue with psychotherapy as able. OBJECTIVE:  No flowsheet data found. MENTAL STATUS EXAM:  Orientation: Alert, oriented, thought content appropriate   Mood:. \"Not good\"      Affect:  frustrated      Appearance:  Age Appropriate, Clothing Appropriate for Age, and Clothing Appropriate for Weather   Thought Process: Within Normal Limits   Thought Content:   Within Normal Limits   Insight:  Limited   Judgment: Fair   Suicidal Ideations: Denies Suicidal

## 2023-06-28 ENCOUNTER — HOSPITAL ENCOUNTER (OUTPATIENT)
Dept: MRI IMAGING | Age: 78
Discharge: HOME | End: 2023-06-28
Payer: MEDICARE

## 2023-06-28 ENCOUNTER — HOSPITAL ENCOUNTER (OUTPATIENT)
Dept: NEUROLOGY | Age: 78
Discharge: HOME | End: 2023-06-28
Payer: MEDICARE

## 2023-06-28 DIAGNOSIS — R41.3 MEMORY PROBLEM: ICD-10-CM

## 2023-06-28 DIAGNOSIS — H53.40 VISUAL FIELD CUT: ICD-10-CM

## 2023-06-28 DIAGNOSIS — Z86.73 HISTORY OF STROKE: ICD-10-CM

## 2023-06-28 PROCEDURE — 95819 EEG AWAKE AND ASLEEP: CPT | Performed by: PSYCHIATRY & NEUROLOGY

## 2023-06-28 PROCEDURE — 70551 MRI BRAIN STEM W/O DYE: CPT

## 2023-06-28 PROCEDURE — 95816 EEG AWAKE AND DROWSY: CPT

## 2023-07-03 ENCOUNTER — OFFICE VISIT (OUTPATIENT)
Dept: NEUROLOGY | Age: 78
End: 2023-07-03
Payer: MEDICARE

## 2023-07-03 VITALS
OXYGEN SATURATION: 99 % | HEART RATE: 65 BPM | DIASTOLIC BLOOD PRESSURE: 70 MMHG | HEIGHT: 63 IN | SYSTOLIC BLOOD PRESSURE: 110 MMHG | WEIGHT: 154 LBS | BODY MASS INDEX: 27.29 KG/M2

## 2023-07-03 DIAGNOSIS — R20.0 LEFT ARM NUMBNESS: ICD-10-CM

## 2023-07-03 DIAGNOSIS — H53.40 VISUAL FIELD CUT: ICD-10-CM

## 2023-07-03 DIAGNOSIS — R25.2 CRAMP IN LIMB: ICD-10-CM

## 2023-07-03 DIAGNOSIS — R41.3 MEMORY PROBLEM: Primary | ICD-10-CM

## 2023-07-03 DIAGNOSIS — Z86.73 HISTORY OF STROKE: ICD-10-CM

## 2023-07-03 PROCEDURE — G8417 CALC BMI ABV UP PARAM F/U: HCPCS | Performed by: NURSE PRACTITIONER

## 2023-07-03 PROCEDURE — 1036F TOBACCO NON-USER: CPT | Performed by: NURSE PRACTITIONER

## 2023-07-03 PROCEDURE — 3074F SYST BP LT 130 MM HG: CPT | Performed by: NURSE PRACTITIONER

## 2023-07-03 PROCEDURE — 1123F ACP DISCUSS/DSCN MKR DOCD: CPT | Performed by: NURSE PRACTITIONER

## 2023-07-03 PROCEDURE — 99214 OFFICE O/P EST MOD 30 MIN: CPT | Performed by: NURSE PRACTITIONER

## 2023-07-03 PROCEDURE — 3078F DIAST BP <80 MM HG: CPT | Performed by: NURSE PRACTITIONER

## 2023-07-03 PROCEDURE — G8427 DOCREV CUR MEDS BY ELIG CLIN: HCPCS | Performed by: NURSE PRACTITIONER

## 2023-07-03 PROCEDURE — G8400 PT W/DXA NO RESULTS DOC: HCPCS | Performed by: NURSE PRACTITIONER

## 2023-07-03 PROCEDURE — 1090F PRES/ABSN URINE INCON ASSESS: CPT | Performed by: NURSE PRACTITIONER

## 2023-07-03 RX ORDER — AMANTADINE HYDROCHLORIDE 100 MG/1
100 CAPSULE, GELATIN COATED ORAL DAILY
Qty: 30 CAPSULE | Refills: 4 | Status: SHIPPED | OUTPATIENT
Start: 2023-07-03

## 2023-07-03 NOTE — PATIENT INSTRUCTIONS
EMG left upper extremity. Start Amantadine 100 mg daily at 8 am  Referral to speech therapy for cognitive therapy  Follow up in 2 months or sooner if needed. Call if any questions or concerns.

## 2023-07-03 NOTE — PROGRESS NOTES
NEUROLOGY OUT PATIENT FOLLOW UP NOTE:  7/3/02158:05 PM    Brett Silva is here for follow up for memory trouble, history of stroke. Allergies   Allergen Reactions    Poultry Meal Shortness Of Breath and Hives    Chicken Allergy Hives    Demerol Swelling    Dextromethorphan Other (See Comments)    Diovan [Valsartan] Other (See Comments)     cough    Guaifenesin Other (See Comments)    Lidocaine Itching, Swelling and Other (See Comments)     Itching, redness, swelling    Novocain [Procaine] Swelling     Tongue swelling with dental injection    Remeron [Mirtazapine] Other (See Comments)     \"out of control drooling\"    Seasonal     Yellow Dye Other (See Comments) and Swelling       Current Outpatient Medications:     VORTIoxetine (TRINTELLIX) 5 MG tablet, Take 1 tablet by mouth daily, Disp: 30 tablet, Rfl: 1    lisinopril (PRINIVIL;ZESTRIL) 10 MG tablet, TAKE 1 TABLET EVERY DAY, Disp: 90 tablet, Rfl: 3    atorvastatin (LIPITOR) 40 MG tablet, TAKE 1 TABLET EVERY DAY, Disp: 90 tablet, Rfl: 0    escitalopram (LEXAPRO) 5 MG tablet, Take 1 tablet by mouth daily, Disp: 30 tablet, Rfl: 1    ALPRAZolam (XANAX) 1 MG tablet, Take 1 tablet by mouth 2 times daily as needed for Sleep or Anxiety for up to 180 days. Max Daily Amount: 2 mg, Disp: 60 tablet, Rfl: 2    sulfaSALAzine (AZULFIDINE) 500 MG tablet, Take 1 tablet by mouth daily, Disp: , Rfl:     carvedilol (COREG) 6.25 MG tablet, TAKE 1 TABLET TWICE DAILY WITH MEALS, Disp: 180 tablet, Rfl: 3    valACYclovir (VALTREX) 1 g tablet, TAKE 2 TABS AT ONSET OF COLD SORE.   REPEAT 2 TABS 12 HOURS LATER., Disp: 12 tablet, Rfl: 3    ASPIRIN LOW DOSE 81 MG EC tablet, TAKE 1 TABLET EVERY DAY, Disp: 90 tablet, Rfl: 3    nitroGLYCERIN (NITROSTAT) 0.4 MG SL tablet, Place 1 tablet under the tongue every 5 minutes as needed for Chest pain, Disp: 25 tablet, Rfl: 1    Multiple Vitamins-Minerals (THERA M PLUS) TABS, Take 1 tablet by mouth daily, Disp: , Rfl:     I reviewed the past

## 2023-07-05 RX ORDER — AMANTADINE HYDROCHLORIDE 100 MG/1
CAPSULE, GELATIN COATED ORAL
Qty: 90 CAPSULE | OUTPATIENT
Start: 2023-07-05

## 2023-07-24 RX ORDER — CARVEDILOL 6.25 MG/1
TABLET ORAL
Qty: 180 TABLET | Refills: 3 | Status: SHIPPED | OUTPATIENT
Start: 2023-07-24

## 2023-08-11 RX ORDER — VALACYCLOVIR HYDROCHLORIDE 1 G/1
TABLET, FILM COATED ORAL
Qty: 12 TABLET | Refills: 3 | Status: SHIPPED | OUTPATIENT
Start: 2023-08-11

## 2023-08-31 DIAGNOSIS — F41.0 PANIC DISORDER: ICD-10-CM

## 2023-09-01 RX ORDER — ALPRAZOLAM 1 MG/1
1 TABLET ORAL 2 TIMES DAILY PRN
Qty: 60 TABLET | Refills: 2 | Status: SHIPPED | OUTPATIENT
Start: 2023-09-01 | End: 2023-11-30

## 2023-09-05 ENCOUNTER — OFFICE VISIT (OUTPATIENT)
Dept: FAMILY MEDICINE CLINIC | Age: 78
End: 2023-09-05
Payer: MEDICARE

## 2023-09-05 VITALS
BODY MASS INDEX: 25.47 KG/M2 | DIASTOLIC BLOOD PRESSURE: 88 MMHG | SYSTOLIC BLOOD PRESSURE: 138 MMHG | HEART RATE: 62 BPM | HEIGHT: 64 IN | TEMPERATURE: 97.3 F | OXYGEN SATURATION: 97 % | WEIGHT: 149.2 LBS

## 2023-09-05 DIAGNOSIS — E78.5 HYPERLIPIDEMIA, UNSPECIFIED HYPERLIPIDEMIA TYPE: ICD-10-CM

## 2023-09-05 DIAGNOSIS — Z00.00 MEDICARE ANNUAL WELLNESS VISIT, SUBSEQUENT: Primary | ICD-10-CM

## 2023-09-05 DIAGNOSIS — I63.02 THROMBOTIC STROKE INVOLVING BASILAR ARTERY (HCC): ICD-10-CM

## 2023-09-05 DIAGNOSIS — I25.10 CORONARY ARTERY DISEASE INVOLVING NATIVE HEART WITHOUT ANGINA PECTORIS, UNSPECIFIED VESSEL OR LESION TYPE: ICD-10-CM

## 2023-09-05 DIAGNOSIS — I10 ESSENTIAL HYPERTENSION: ICD-10-CM

## 2023-09-05 PROCEDURE — G0439 PPPS, SUBSEQ VISIT: HCPCS | Performed by: NURSE PRACTITIONER

## 2023-09-05 PROCEDURE — 3079F DIAST BP 80-89 MM HG: CPT | Performed by: NURSE PRACTITIONER

## 2023-09-05 PROCEDURE — 1123F ACP DISCUSS/DSCN MKR DOCD: CPT | Performed by: NURSE PRACTITIONER

## 2023-09-05 PROCEDURE — 3075F SYST BP GE 130 - 139MM HG: CPT | Performed by: NURSE PRACTITIONER

## 2023-09-05 RX ORDER — SENNA AND DOCUSATE SODIUM 50; 8.6 MG/1; MG/1
1 TABLET, FILM COATED ORAL DAILY
Qty: 90 TABLET | Refills: 3 | COMMUNITY
Start: 2023-09-05

## 2023-09-05 RX ORDER — NITROGLYCERIN 0.4 MG/1
0.4 TABLET SUBLINGUAL EVERY 5 MIN PRN
Qty: 75 TABLET | Refills: 1 | Status: SHIPPED | OUTPATIENT
Start: 2023-09-05

## 2023-09-05 RX ORDER — ASPIRIN 81 MG/1
81 TABLET ORAL DAILY
Qty: 90 TABLET | Refills: 3 | Status: SHIPPED | OUTPATIENT
Start: 2023-09-05 | End: 2024-08-30

## 2023-09-05 RX ORDER — ATORVASTATIN CALCIUM 40 MG/1
40 TABLET, FILM COATED ORAL DAILY
Qty: 90 TABLET | Refills: 3 | Status: SHIPPED | OUTPATIENT
Start: 2023-09-05

## 2023-09-05 SDOH — ECONOMIC STABILITY: FOOD INSECURITY: WITHIN THE PAST 12 MONTHS, YOU WORRIED THAT YOUR FOOD WOULD RUN OUT BEFORE YOU GOT MONEY TO BUY MORE.: NEVER TRUE

## 2023-09-05 SDOH — ECONOMIC STABILITY: FOOD INSECURITY: WITHIN THE PAST 12 MONTHS, THE FOOD YOU BOUGHT JUST DIDN'T LAST AND YOU DIDN'T HAVE MONEY TO GET MORE.: NEVER TRUE

## 2023-09-05 SDOH — ECONOMIC STABILITY: HOUSING INSECURITY
IN THE LAST 12 MONTHS, WAS THERE A TIME WHEN YOU DID NOT HAVE A STEADY PLACE TO SLEEP OR SLEPT IN A SHELTER (INCLUDING NOW)?: NO

## 2023-09-05 SDOH — HEALTH STABILITY: PHYSICAL HEALTH: ON AVERAGE, HOW MANY DAYS PER WEEK DO YOU ENGAGE IN MODERATE TO STRENUOUS EXERCISE (LIKE A BRISK WALK)?: 0 DAYS

## 2023-09-05 SDOH — HEALTH STABILITY: PHYSICAL HEALTH: ON AVERAGE, HOW MANY MINUTES DO YOU ENGAGE IN EXERCISE AT THIS LEVEL?: 0 MIN

## 2023-09-05 SDOH — ECONOMIC STABILITY: INCOME INSECURITY: HOW HARD IS IT FOR YOU TO PAY FOR THE VERY BASICS LIKE FOOD, HOUSING, MEDICAL CARE, AND HEATING?: NOT HARD AT ALL

## 2023-09-05 ASSESSMENT — PATIENT HEALTH QUESTIONNAIRE - PHQ9
SUM OF ALL RESPONSES TO PHQ QUESTIONS 1-9: 0
SUM OF ALL RESPONSES TO PHQ QUESTIONS 1-9: 0
SUM OF ALL RESPONSES TO PHQ9 QUESTIONS 1 & 2: 0
SUM OF ALL RESPONSES TO PHQ QUESTIONS 1-9: 0
1. LITTLE INTEREST OR PLEASURE IN DOING THINGS: 0
2. FEELING DOWN, DEPRESSED OR HOPELESS: 0
SUM OF ALL RESPONSES TO PHQ QUESTIONS 1-9: 0

## 2023-09-05 ASSESSMENT — LIFESTYLE VARIABLES
HOW OFTEN DO YOU HAVE A DRINK CONTAINING ALCOHOL: 1
HOW MANY STANDARD DRINKS CONTAINING ALCOHOL DO YOU HAVE ON A TYPICAL DAY: 0
HOW MANY STANDARD DRINKS CONTAINING ALCOHOL DO YOU HAVE ON A TYPICAL DAY: PATIENT DOES NOT DRINK
HOW OFTEN DO YOU HAVE A DRINK CONTAINING ALCOHOL: NEVER
HOW OFTEN DO YOU HAVE SIX OR MORE DRINKS ON ONE OCCASION: 1

## 2023-09-05 NOTE — PATIENT INSTRUCTIONS
time. There are many options. Some groups have a  who helps lead discussions or shares information. Others are less formal. Some meet in person, while others meet online. Try using these resources to help you find the best support group for you. Your doctor, health care team, or counselor. People with the same health concern. Your local Buddhist, Holiness, Zoroastrianism, or other Pentecostal group. A city, state, or national group that provides support for your health concern. Check your local [address] [place] [address] or [facility] for a list of these groups. Or look for information online. Your local community, friends, and family. Supportive relationships  A supportive relationship includes emotional support such as love, trust, and understanding, as well as advice and concrete help, such as help managing your time. Reach out to others  Family and friends can help you. Ask them to:  Listen to you and give you encouragement. This can keep you from feeling hopeless or alone. Help with small daily tasks or with bigger problems. A helping hand can keep you from feeling overwhelmed. Help you manage a health problem. For example, ask them to go to doctor visits with you. Your loved ones can offer support by being involved in your medical care. Respect your relationships  A good relationship is also a two-way street. You count on help from others, but they also count on you. Know your friends' limits. You don't have to see or call your friends every day. If you are going through a rough patch, ask friends if you can contact them outside of the usual boundaries. Don't always complain or talk about yourself. Know when it's time to stop talking and listen or just enjoy your friend's company. Know that good friends can be a bad influence. For example, if a friend encourages you to drink when you know it will harm you, you may want to end the friendship. Where can you learn more?   Go to

## 2023-09-05 NOTE — PROGRESS NOTES
[facility]  Dept: Zachery Marinelli (:  1945) is a 66 y.o. female,Established patient, here for evaluation of the following chief complaint(s):  Medicare AWV Medicare Annual Wellness Visit    Sriram oBoth is here for Medicare AWV    Assessment & Plan   I have reviewed the patient's medical history in detail and updated the computerized patient record. HPI/ROS per the patient and caregiver. Overall non toxic in appearance. Answers questions appropriately. Conditions discussed and addressed this visit include:   Controlled substances monitoring: possible medication side effects, risk of tolerance and/or dependence, and alternative treatments discussed, no signs of potential drug abuse or diversion identified, and OARRS report reviewed today- activity consistent with treatment plan. Hre for follow up  Htn controlled  Cad controlled on current therapy  Stroke symptoms stable. No new neuro intervention needed  Labs for hld this month  No associated sx of myopathy r/t statin  Vision and memory unchnaged at t his time. Medicare annual wellness visit, subsequent  Coronary artery disease involving native heart without angina pectoris, unspecified vessel or lesion type  -     atorvastatin (LIPITOR) 40 MG tablet; Take 1 tablet by mouth daily, Disp-90 tablet, R-3Normal  -     aspirin (ASPIRIN LOW DOSE) 81 MG EC tablet; Take 1 tablet by mouth daily, Disp-90 tablet, R-3Normal  Thrombotic stroke involving basilar artery (HCC)Being evaluated/managed by pcp and neurology. No acute findings today meriting change in management plan. -     atorvastatin (LIPITOR) 40 MG tablet; Take 1 tablet by mouth daily, Disp-90 tablet, R-3Normal  Essential hypertension  -     atorvastatin (LIPITOR) 40 MG tablet; Take 1 tablet by mouth daily, Disp-90 tablet, R-3Normal  Hyperlipidemia, unspecified hyperlipidemia type  -     atorvastatin (LIPITOR) 40 MG tablet;  Take 1

## 2023-11-17 ENCOUNTER — NURSE ONLY (OUTPATIENT)
Dept: LAB | Age: 78
End: 2023-11-17

## 2023-11-17 DIAGNOSIS — I63.02 THROMBOTIC STROKE INVOLVING BASILAR ARTERY (HCC): ICD-10-CM

## 2023-11-17 DIAGNOSIS — I25.10 CORONARY ARTERY DISEASE INVOLVING NATIVE HEART WITHOUT ANGINA PECTORIS, UNSPECIFIED VESSEL OR LESION TYPE: ICD-10-CM

## 2023-11-17 DIAGNOSIS — I10 ESSENTIAL HYPERTENSION: ICD-10-CM

## 2023-11-17 DIAGNOSIS — E78.5 HYPERLIPIDEMIA, UNSPECIFIED HYPERLIPIDEMIA TYPE: ICD-10-CM

## 2023-11-17 LAB
ALBUMIN SERPL BCG-MCNC: 4.1 G/DL (ref 3.5–5.1)
ALP SERPL-CCNC: 83 U/L (ref 38–126)
ALT SERPL W/O P-5'-P-CCNC: 15 U/L (ref 11–66)
ANION GAP SERPL CALC-SCNC: 9 MEQ/L (ref 8–16)
AST SERPL-CCNC: 18 U/L (ref 5–40)
BASOPHILS ABSOLUTE: 0 THOU/MM3 (ref 0–0.1)
BASOPHILS NFR BLD AUTO: 0.4 %
BILIRUB SERPL-MCNC: 0.5 MG/DL (ref 0.3–1.2)
BUN SERPL-MCNC: 11 MG/DL (ref 7–22)
CALCIUM SERPL-MCNC: 9.7 MG/DL (ref 8.5–10.5)
CHLORIDE SERPL-SCNC: 108 MEQ/L (ref 98–111)
CHOLEST SERPL-MCNC: 145 MG/DL (ref 100–199)
CO2 SERPL-SCNC: 27 MEQ/L (ref 23–33)
CREAT SERPL-MCNC: 0.7 MG/DL (ref 0.4–1.2)
DEPRECATED RDW RBC AUTO: 44 FL (ref 35–45)
EOSINOPHIL NFR BLD AUTO: 2 %
EOSINOPHILS ABSOLUTE: 0.1 THOU/MM3 (ref 0–0.4)
ERYTHROCYTE [DISTWIDTH] IN BLOOD BY AUTOMATED COUNT: 12.9 % (ref 11.5–14.5)
GFR SERPL CREATININE-BSD FRML MDRD: > 60 ML/MIN/1.73M2
GLUCOSE SERPL-MCNC: 84 MG/DL (ref 70–108)
HCT VFR BLD AUTO: 44.9 % (ref 37–47)
HDLC SERPL-MCNC: 48 MG/DL
HGB BLD-MCNC: 15 GM/DL (ref 12–16)
IMM GRANULOCYTES # BLD AUTO: 0.02 THOU/MM3 (ref 0–0.07)
IMM GRANULOCYTES NFR BLD AUTO: 0.4 %
LDLC SERPL CALC-MCNC: 78 MG/DL
LYMPHOCYTES ABSOLUTE: 2 THOU/MM3 (ref 1–4.8)
LYMPHOCYTES NFR BLD AUTO: 36.5 %
MCH RBC QN AUTO: 31.1 PG (ref 26–33)
MCHC RBC AUTO-ENTMCNC: 33.4 GM/DL (ref 32.2–35.5)
MCV RBC AUTO: 93.2 FL (ref 81–99)
MONOCYTES ABSOLUTE: 0.5 THOU/MM3 (ref 0.4–1.3)
MONOCYTES NFR BLD AUTO: 9 %
NEUTROPHILS NFR BLD AUTO: 51.7 %
NRBC BLD AUTO-RTO: 0 /100 WBC
PLATELET # BLD AUTO: 205 THOU/MM3 (ref 130–400)
PMV BLD AUTO: 11.5 FL (ref 9.4–12.4)
POTASSIUM SERPL-SCNC: 4.5 MEQ/L (ref 3.5–5.2)
PROT SERPL-MCNC: 6.7 G/DL (ref 6.1–8)
RBC # BLD AUTO: 4.82 MILL/MM3 (ref 4.2–5.4)
SEGMENTED NEUTROPHILS ABSOLUTE COUNT: 2.9 THOU/MM3 (ref 1.8–7.7)
SODIUM SERPL-SCNC: 144 MEQ/L (ref 135–145)
TRIGL SERPL-MCNC: 94 MG/DL (ref 0–199)
WBC # BLD AUTO: 5.6 THOU/MM3 (ref 4.8–10.8)

## 2023-11-20 ENCOUNTER — TELEPHONE (OUTPATIENT)
Dept: FAMILY MEDICINE CLINIC | Age: 78
End: 2023-11-20

## 2023-11-20 NOTE — TELEPHONE ENCOUNTER
I copy and pasted Rosa's response regarding the patient's lab results and sent them to the patient via My Chart.

## 2023-11-20 NOTE — TELEPHONE ENCOUNTER
----- Message from PAT Gamboa CNP sent at 11/17/2023  7:04 PM EST -----  Labs are stable. No change to current medications.

## 2024-02-02 RX ORDER — NITROGLYCERIN 0.4 MG/1
0.4 TABLET SUBLINGUAL EVERY 5 MIN PRN
Qty: 75 TABLET | Refills: 3 | OUTPATIENT
Start: 2024-02-02

## 2024-02-02 NOTE — TELEPHONE ENCOUNTER
I called the patient and spoke to the patient's Spouse, Chandan. I check with him about this medication refill and he said that it does not need to yet. I let him know to give the office a call when a refill is needed. He voiced understanding.

## 2024-02-16 DIAGNOSIS — F41.0 PANIC DISORDER: ICD-10-CM

## 2024-02-19 RX ORDER — ALPRAZOLAM 1 MG/1
TABLET ORAL
Qty: 60 TABLET | Refills: 0 | Status: SHIPPED | OUTPATIENT
Start: 2024-02-19 | End: 2024-03-20

## 2024-02-19 NOTE — TELEPHONE ENCOUNTER
Controlled substances monitoring: possible medication side effects, risk of tolerance and/or dependence, and alternative treatments discussed, no signs of potential drug abuse or diversion identified, and OARRS report reviewed today- activity consistent with treatment plan.

## 2024-02-19 NOTE — TELEPHONE ENCOUNTER
I called and spoke to patient's spouse Chandan, I let him know Rosa sent in a prescription for 1 MG Xanaxh to [facility]. Chandan stated understanding!

## 2024-04-13 DIAGNOSIS — F41.0 PANIC DISORDER: ICD-10-CM

## 2024-04-15 RX ORDER — ALPRAZOLAM 1 MG/1
TABLET ORAL
Qty: 60 TABLET | Refills: 0 | Status: SHIPPED | OUTPATIENT
Start: 2024-04-15 | End: 2024-05-15

## 2024-05-13 ENCOUNTER — OFFICE VISIT (OUTPATIENT)
Dept: FAMILY MEDICINE CLINIC | Age: 79
End: 2024-05-13
Payer: MEDICARE

## 2024-05-13 VITALS
BODY MASS INDEX: 25.56 KG/M2 | DIASTOLIC BLOOD PRESSURE: 88 MMHG | TEMPERATURE: 97.7 F | WEIGHT: 149.4 LBS | SYSTOLIC BLOOD PRESSURE: 122 MMHG | OXYGEN SATURATION: 98 % | HEART RATE: 61 BPM

## 2024-05-13 DIAGNOSIS — F41.0 PANIC DISORDER: Primary | ICD-10-CM

## 2024-05-13 DIAGNOSIS — I10 ESSENTIAL HYPERTENSION: ICD-10-CM

## 2024-05-13 DIAGNOSIS — E78.5 HYPERLIPIDEMIA, UNSPECIFIED HYPERLIPIDEMIA TYPE: ICD-10-CM

## 2024-05-13 DIAGNOSIS — H53.9 VISION DISTURBANCE FOLLOWING CEREBROVASCULAR ACCIDENT: ICD-10-CM

## 2024-05-13 DIAGNOSIS — I25.10 CORONARY ARTERY DISEASE INVOLVING NATIVE HEART WITHOUT ANGINA PECTORIS, UNSPECIFIED VESSEL OR LESION TYPE: ICD-10-CM

## 2024-05-13 DIAGNOSIS — I69.398 VISION DISTURBANCE FOLLOWING CEREBROVASCULAR ACCIDENT: ICD-10-CM

## 2024-05-13 PROCEDURE — 1123F ACP DISCUSS/DSCN MKR DOCD: CPT | Performed by: NURSE PRACTITIONER

## 2024-05-13 PROCEDURE — 99214 OFFICE O/P EST MOD 30 MIN: CPT | Performed by: NURSE PRACTITIONER

## 2024-05-13 PROCEDURE — G8427 DOCREV CUR MEDS BY ELIG CLIN: HCPCS | Performed by: NURSE PRACTITIONER

## 2024-05-13 PROCEDURE — 3074F SYST BP LT 130 MM HG: CPT | Performed by: NURSE PRACTITIONER

## 2024-05-13 PROCEDURE — G8417 CALC BMI ABV UP PARAM F/U: HCPCS | Performed by: NURSE PRACTITIONER

## 2024-05-13 PROCEDURE — 3079F DIAST BP 80-89 MM HG: CPT | Performed by: NURSE PRACTITIONER

## 2024-05-13 PROCEDURE — 1090F PRES/ABSN URINE INCON ASSESS: CPT | Performed by: NURSE PRACTITIONER

## 2024-05-13 PROCEDURE — G8400 PT W/DXA NO RESULTS DOC: HCPCS | Performed by: NURSE PRACTITIONER

## 2024-05-13 PROCEDURE — 1036F TOBACCO NON-USER: CPT | Performed by: NURSE PRACTITIONER

## 2024-05-13 NOTE — PROGRESS NOTES
[address] [place] [address]  Dept: [phone]          Jessica Iglesias (:  1945) is a 78 y.o. female,Established patient, here for evaluation of the following chief complaint(s):  6 Month Follow-Up      ASSESSMENT/PLAN:  I have reviewed the patient's medical history in detail and updated the computerized patient record.  HPI/ROS per the patient and caregiver.   Overall non toxic in appearance. Answers questions appropriately.   Conditions discussed and addressed this visit include:   Controlled substances monitoring: possible medication side effects, risk of tolerance and/or dependence, and alternative treatments discussed, no signs of potential drug abuse or diversion identified, and OARRS report reviewed today- activity consistent with treatment plan.    Here for follow up  Labs stable in November  Continue meds as ordered  Home safety measures provided  DASH diet  Exercise as tolerated  The patient is advised to follow a low fat, low cholesterol diet, attempt to lose weight, reduce salt in diet and cooking, reduce exposure to stress, improve dietary compliance, continue current medications, and continue current healthy lifestyle patterns.    1. Panic disorder  -     ALPRAZolam (XANAX) 1 MG tablet; TAKE 1 TABLET 2 TIMES DAILY AS NEEDED FOR ANXIETY OR SLEEP (MAX DAILY AMOUNT: 2 TABLETS), Disp-60 tablet, R-2Normal  2. Essential hypertension  -     carvedilol (COREG) 6.25 MG tablet; Take 1 tablet by mouth 2 times daily (with meals), Disp-180 tablet, R-3Normal  -     lisinopril (PRINIVIL;ZESTRIL) 10 MG tablet; Take 1 tablet by mouth daily, Disp-90 tablet, R-3Normal  -     nitroGLYCERIN (NITROSTAT) 0.4 MG SL tablet; Place 1 tablet under the tongue every 5 minutes as needed for Chest pain, Disp-75 tablet, R-1Normal  3. Vision disturbance following cerebrovascular accident  4. Hyperlipidemia, unspecified hyperlipidemia type  5. Coronary artery disease involving native heart without angina

## 2024-05-14 PROBLEM — H53.9 VISION DISTURBANCE FOLLOWING CEREBROVASCULAR ACCIDENT: Status: ACTIVE | Noted: 2024-05-14

## 2024-05-14 PROBLEM — I69.398 VISION DISTURBANCE FOLLOWING CEREBROVASCULAR ACCIDENT: Status: ACTIVE | Noted: 2024-05-14

## 2024-05-14 RX ORDER — ALPRAZOLAM 1 MG/1
TABLET ORAL
Qty: 60 TABLET | Refills: 2 | Status: SHIPPED | OUTPATIENT
Start: 2024-05-14 | End: 2024-08-14

## 2024-05-14 RX ORDER — NITROGLYCERIN 0.4 MG/1
0.4 TABLET SUBLINGUAL EVERY 5 MIN PRN
Qty: 75 TABLET | Refills: 1 | Status: SHIPPED | OUTPATIENT
Start: 2024-05-14

## 2024-05-14 RX ORDER — SENNA AND DOCUSATE SODIUM 50; 8.6 MG/1; MG/1
1 TABLET, FILM COATED ORAL DAILY
Qty: 90 TABLET | Refills: 3 | Status: SHIPPED | OUTPATIENT
Start: 2024-05-14

## 2024-05-14 RX ORDER — CARVEDILOL 6.25 MG/1
6.25 TABLET ORAL 2 TIMES DAILY WITH MEALS
Qty: 180 TABLET | Refills: 3 | Status: SHIPPED | OUTPATIENT
Start: 2024-05-14

## 2024-05-14 RX ORDER — LISINOPRIL 10 MG/1
10 TABLET ORAL DAILY
Qty: 90 TABLET | Refills: 3 | Status: SHIPPED | OUTPATIENT
Start: 2024-05-14

## 2024-05-14 ASSESSMENT — ENCOUNTER SYMPTOMS
CHEST TIGHTNESS: 0
ALLERGIC/IMMUNOLOGIC NEGATIVE: 1
CHOKING: 0
CONSTIPATION: 0
ABDOMINAL DISTENTION: 0
DIARRHEA: 0
ABDOMINAL PAIN: 0

## 2024-10-07 ENCOUNTER — TELEPHONE (OUTPATIENT)
Dept: FAMILY MEDICINE CLINIC | Age: 79
End: 2024-10-07

## 2024-10-07 DIAGNOSIS — I10 ESSENTIAL HYPERTENSION: ICD-10-CM

## 2024-10-07 RX ORDER — NITROGLYCERIN 0.4 MG/1
0.4 TABLET SUBLINGUAL EVERY 5 MIN PRN
Qty: 75 TABLET | Refills: 1 | Status: SHIPPED | OUTPATIENT
Start: 2024-10-07

## 2024-10-07 NOTE — TELEPHONE ENCOUNTER
[facility] [facility] sent a request for nitroglycerine 0.4 MG   Patient's last appointment was : 5/13/2024  Patient's next appointment is : 10/14/2024  Last refilled:5-14-24

## 2024-10-12 SDOH — HEALTH STABILITY: PHYSICAL HEALTH: ON AVERAGE, HOW MANY DAYS PER WEEK DO YOU ENGAGE IN MODERATE TO STRENUOUS EXERCISE (LIKE A BRISK WALK)?: 1 DAY

## 2024-10-12 SDOH — HEALTH STABILITY: PHYSICAL HEALTH: ON AVERAGE, HOW MANY MINUTES DO YOU ENGAGE IN EXERCISE AT THIS LEVEL?: 10 MIN

## 2024-10-12 SDOH — ECONOMIC STABILITY: FOOD INSECURITY: WITHIN THE PAST 12 MONTHS, THE FOOD YOU BOUGHT JUST DIDN'T LAST AND YOU DIDN'T HAVE MONEY TO GET MORE.: NEVER TRUE

## 2024-10-12 SDOH — ECONOMIC STABILITY: FOOD INSECURITY: WITHIN THE PAST 12 MONTHS, YOU WORRIED THAT YOUR FOOD WOULD RUN OUT BEFORE YOU GOT MONEY TO BUY MORE.: NEVER TRUE

## 2024-10-12 SDOH — ECONOMIC STABILITY: INCOME INSECURITY: HOW HARD IS IT FOR YOU TO PAY FOR THE VERY BASICS LIKE FOOD, HOUSING, MEDICAL CARE, AND HEATING?: NOT HARD AT ALL

## 2024-10-12 ASSESSMENT — PATIENT HEALTH QUESTIONNAIRE - PHQ9
6. FEELING BAD ABOUT YOURSELF - OR THAT YOU ARE A FAILURE OR HAVE LET YOURSELF OR YOUR FAMILY DOWN: NOT AT ALL
1. LITTLE INTEREST OR PLEASURE IN DOING THINGS: NOT AT ALL
SUM OF ALL RESPONSES TO PHQ QUESTIONS 1-9: 0
7. TROUBLE CONCENTRATING ON THINGS, SUCH AS READING THE NEWSPAPER OR WATCHING TELEVISION: NOT AT ALL
SUM OF ALL RESPONSES TO PHQ QUESTIONS 1-9: 0
9. THOUGHTS THAT YOU WOULD BE BETTER OFF DEAD, OR OF HURTING YOURSELF: NOT AT ALL
4. FEELING TIRED OR HAVING LITTLE ENERGY: NOT AT ALL
3. TROUBLE FALLING OR STAYING ASLEEP: NOT AT ALL
SUM OF ALL RESPONSES TO PHQ QUESTIONS 1-9: 0
SUM OF ALL RESPONSES TO PHQ QUESTIONS 1-9: 0
2. FEELING DOWN, DEPRESSED OR HOPELESS: NOT AT ALL
5. POOR APPETITE OR OVEREATING: NOT AT ALL
SUM OF ALL RESPONSES TO PHQ9 QUESTIONS 1 & 2: 0
10. IF YOU CHECKED OFF ANY PROBLEMS, HOW DIFFICULT HAVE THESE PROBLEMS MADE IT FOR YOU TO DO YOUR WORK, TAKE CARE OF THINGS AT HOME, OR GET ALONG WITH OTHER PEOPLE: NOT DIFFICULT AT ALL
8. MOVING OR SPEAKING SO SLOWLY THAT OTHER PEOPLE COULD HAVE NOTICED. OR THE OPPOSITE, BEING SO FIGETY OR RESTLESS THAT YOU HAVE BEEN MOVING AROUND A LOT MORE THAN USUAL: NOT AT ALL

## 2024-10-12 ASSESSMENT — LIFESTYLE VARIABLES
HOW OFTEN DO YOU HAVE A DRINK CONTAINING ALCOHOL: 1
HOW MANY STANDARD DRINKS CONTAINING ALCOHOL DO YOU HAVE ON A TYPICAL DAY: 0
HOW OFTEN DO YOU HAVE A DRINK CONTAINING ALCOHOL: NEVER
HOW MANY STANDARD DRINKS CONTAINING ALCOHOL DO YOU HAVE ON A TYPICAL DAY: PATIENT DOES NOT DRINK
HOW OFTEN DO YOU HAVE SIX OR MORE DRINKS ON ONE OCCASION: 1

## 2024-10-14 ENCOUNTER — OFFICE VISIT (OUTPATIENT)
Dept: FAMILY MEDICINE CLINIC | Age: 79
End: 2024-10-14

## 2024-10-14 VITALS
BODY MASS INDEX: 25.78 KG/M2 | HEIGHT: 64 IN | WEIGHT: 151 LBS | SYSTOLIC BLOOD PRESSURE: 120 MMHG | HEART RATE: 68 BPM | TEMPERATURE: 98.7 F | DIASTOLIC BLOOD PRESSURE: 72 MMHG | RESPIRATION RATE: 16 BRPM | OXYGEN SATURATION: 98 %

## 2024-10-14 DIAGNOSIS — R42 DIZZINESS: ICD-10-CM

## 2024-10-14 DIAGNOSIS — Z00.00 MEDICARE ANNUAL WELLNESS VISIT, SUBSEQUENT: ICD-10-CM

## 2024-10-14 DIAGNOSIS — I25.10 CORONARY ARTERY DISEASE INVOLVING NATIVE HEART WITHOUT ANGINA PECTORIS, UNSPECIFIED VESSEL OR LESION TYPE: ICD-10-CM

## 2024-10-14 DIAGNOSIS — E78.5 HYPERLIPIDEMIA, UNSPECIFIED HYPERLIPIDEMIA TYPE: ICD-10-CM

## 2024-10-14 DIAGNOSIS — I10 ESSENTIAL HYPERTENSION: ICD-10-CM

## 2024-10-14 DIAGNOSIS — I63.02 THROMBOTIC STROKE INVOLVING BASILAR ARTERY (HCC): ICD-10-CM

## 2024-10-14 DIAGNOSIS — Z76.89 ENCOUNTER TO ESTABLISH CARE: Primary | ICD-10-CM

## 2024-10-14 RX ORDER — FOLIC ACID 1 MG/1
TABLET ORAL
COMMUNITY
Start: 2024-10-03

## 2024-10-14 RX ORDER — ASPIRIN 81 MG/1
81 TABLET ORAL DAILY
Qty: 90 TABLET | Refills: 3 | Status: SHIPPED | OUTPATIENT
Start: 2024-10-14 | End: 2025-10-09

## 2024-10-14 RX ORDER — ALPRAZOLAM 1 MG
1 TABLET ORAL NIGHTLY PRN
COMMUNITY

## 2024-10-14 RX ORDER — ATORVASTATIN CALCIUM 40 MG/1
40 TABLET, FILM COATED ORAL DAILY
Qty: 90 TABLET | Refills: 3 | Status: SHIPPED | OUTPATIENT
Start: 2024-10-14

## 2024-10-14 ASSESSMENT — PATIENT HEALTH QUESTIONNAIRE - PHQ9
2. FEELING DOWN, DEPRESSED OR HOPELESS: NOT AT ALL
SUM OF ALL RESPONSES TO PHQ QUESTIONS 1-9: 0
SUM OF ALL RESPONSES TO PHQ QUESTIONS 1-9: 0
SUM OF ALL RESPONSES TO PHQ9 QUESTIONS 1 & 2: 0
SUM OF ALL RESPONSES TO PHQ QUESTIONS 1-9: 0
SUM OF ALL RESPONSES TO PHQ QUESTIONS 1-9: 0
1. LITTLE INTEREST OR PLEASURE IN DOING THINGS: NOT AT ALL

## 2024-10-14 NOTE — PROGRESS NOTES
APRN - CNP   valACYclovir (VALTREX) 1 g tablet TAKE 2 TABS AT ONSET OF COLD SORE.  REPEAT 2 TABS 12 HOURS LATER. Yes Rosa Cloud APRN - CNP   sulfaSALAzine (AZULFIDINE) 500 MG tablet Take 1 tablet by mouth daily Yes ProviderRadha MD   Multiple Vitamins-Minerals (THERA M PLUS) TABS Take 1 tablet by mouth daily Yes ProviderRadha MD   aspirin (ASPIRIN LOW DOSE) 81 MG EC tablet Take 1 tablet by mouth daily  Rosa Cloud APRN - CNP       CareTeam (Including outside providers/suppliers regularly involved in providing care):   Patient Care Team:  Houston Mosher APRN - CNP as PCP - General (Nurse Practitioner)  Houston Mosher APRN - CNP as PCP - Empaneled Provider  Trixie Real DO as Physician (Cardiology)  Mathew Beyer DO as Physician (Internal Medicine)  Kimani Long MD as Consulting Physician (Gastroenterology)      Reviewed and updated this visit:  Tobacco  Allergies  Meds  Med Hx  Surg Hx  Soc Hx  Fam Hx

## 2024-11-18 ENCOUNTER — TELEPHONE (OUTPATIENT)
Dept: FAMILY MEDICINE CLINIC | Age: 79
End: 2024-11-18

## 2024-11-18 DIAGNOSIS — G47.00 INSOMNIA, UNSPECIFIED TYPE: Primary | ICD-10-CM

## 2024-11-18 RX ORDER — ALPRAZOLAM 1 MG/1
1 TABLET ORAL NIGHTLY PRN
Qty: 30 TABLET | Refills: 0 | Status: SHIPPED | OUTPATIENT
Start: 2024-11-18 | End: 2024-12-18

## 2024-11-18 NOTE — TELEPHONE ENCOUNTER
On 11- our office received a refill request for Alprazolam 1 mg from [facility] for the patient.

## 2024-11-18 NOTE — TELEPHONE ENCOUNTER
OARRS report check, no red flags, historically has been taking for sleep for nearly a decade. Has failed other medications for sleep.

## 2025-01-25 DIAGNOSIS — E78.5 HYPERLIPIDEMIA, UNSPECIFIED HYPERLIPIDEMIA TYPE: ICD-10-CM

## 2025-01-25 DIAGNOSIS — I63.02 THROMBOTIC STROKE INVOLVING BASILAR ARTERY (HCC): ICD-10-CM

## 2025-01-25 DIAGNOSIS — I25.10 CORONARY ARTERY DISEASE INVOLVING NATIVE HEART WITHOUT ANGINA PECTORIS, UNSPECIFIED VESSEL OR LESION TYPE: ICD-10-CM

## 2025-01-25 DIAGNOSIS — I10 ESSENTIAL HYPERTENSION: ICD-10-CM

## 2025-01-27 RX ORDER — ATORVASTATIN CALCIUM 40 MG/1
40 TABLET, FILM COATED ORAL DAILY
Qty: 90 TABLET | Refills: 3 | Status: SHIPPED | OUTPATIENT
Start: 2025-01-27

## 2025-01-27 RX ORDER — VALACYCLOVIR HYDROCHLORIDE 1 G/1
TABLET, FILM COATED ORAL
Qty: 12 TABLET | Refills: 3 | Status: SHIPPED | OUTPATIENT
Start: 2025-01-27 | End: 2025-01-27 | Stop reason: SDUPTHER

## 2025-01-27 RX ORDER — LISINOPRIL 10 MG/1
10 TABLET ORAL DAILY
Qty: 90 TABLET | Refills: 3 | Status: SHIPPED | OUTPATIENT
Start: 2025-01-27

## 2025-01-27 RX ORDER — VALACYCLOVIR HYDROCHLORIDE 1 G/1
TABLET, FILM COATED ORAL
Qty: 12 TABLET | Refills: 3 | Status: SHIPPED | OUTPATIENT
Start: 2025-01-27

## 2025-01-27 NOTE — TELEPHONE ENCOUNTER
Called Chandan to inform him that Jessica's prescriptions had been sent to [facility]. No answer, voicemail was full.

## 2025-01-27 NOTE — TELEPHONE ENCOUNTER
The patient's EC, Chandan called back. I let him know that the medication he requested to go to  on  in [place] was sent. He voiced understanding.

## 2025-01-27 NOTE — TELEPHONE ENCOUNTER
Jessica Iglesias needs refill of   Requested Prescriptions     Pending Prescriptions Disp Refills    lisinopril (PRINIVIL;ZESTRIL) 10 MG tablet 90 tablet 3     Sig: Take 1 tablet by mouth daily    atorvastatin (LIPITOR) 40 MG tablet 90 tablet 3     Sig: Take 1 tablet by mouth daily       Last Filled on:      Last Visit Date:  10/14/2024    Next Visit Date:  Visit date not found

## 2025-01-27 NOTE — TELEPHONE ENCOUNTER
Chandan (Pt's EC) called stating [facility] does not have the valacyclovir in stock currently and is requesting for it to be sent to [facility] [address]?

## 2025-02-01 ENCOUNTER — PATIENT MESSAGE (OUTPATIENT)
Dept: FAMILY MEDICINE CLINIC | Age: 80
End: 2025-02-01

## 2025-02-01 DIAGNOSIS — F51.01 PRIMARY INSOMNIA: Primary | ICD-10-CM

## 2025-02-03 RX ORDER — ALPRAZOLAM 1 MG/1
1 TABLET ORAL
Qty: 30 TABLET | Refills: 0 | Status: SHIPPED | OUTPATIENT
Start: 2025-02-03 | End: 2025-03-05

## 2025-02-03 NOTE — TELEPHONE ENCOUNTER
OARRS report reviewed, no red flags noted. Will need to have patient sign med contract at next visit.